# Patient Record
Sex: FEMALE | Race: WHITE | NOT HISPANIC OR LATINO | Employment: OTHER | URBAN - NONMETROPOLITAN AREA
[De-identification: names, ages, dates, MRNs, and addresses within clinical notes are randomized per-mention and may not be internally consistent; named-entity substitution may affect disease eponyms.]

---

## 2020-04-07 RX ORDER — AMLODIPINE BESYLATE 10 MG/1
TABLET ORAL
Qty: 90 TABLET | Refills: 1 | OUTPATIENT
Start: 2020-04-07

## 2020-06-22 PROBLEM — I25.84 CORONARY ARTERY DISEASE DUE TO CALCIFIED CORONARY LESION: Status: ACTIVE | Noted: 2020-06-22

## 2020-06-22 PROBLEM — R60.0 EDEMA LEG: Status: ACTIVE | Noted: 2020-06-22

## 2020-06-22 PROBLEM — E11.9 DIABETES MELLITUS: Status: ACTIVE | Noted: 2020-06-22

## 2020-06-22 PROBLEM — G47.33 OSA ON CPAP: Status: ACTIVE | Noted: 2020-06-22

## 2020-06-22 PROBLEM — I25.2 OLD MI (MYOCARDIAL INFARCTION): Status: ACTIVE | Noted: 2020-06-22

## 2020-06-22 PROBLEM — I10 ESSENTIAL HYPERTENSION: Status: ACTIVE | Noted: 2020-06-22

## 2020-06-22 PROBLEM — Z99.89 OSA ON CPAP: Status: ACTIVE | Noted: 2020-06-22

## 2020-06-22 PROBLEM — J45.909 ASTHMA: Status: ACTIVE | Noted: 2020-06-22

## 2020-06-22 PROBLEM — I49.5 SSS (SICK SINUS SYNDROME): Status: ACTIVE | Noted: 2020-06-22

## 2020-06-22 PROBLEM — I25.10 CORONARY ARTERY DISEASE DUE TO CALCIFIED CORONARY LESION: Status: ACTIVE | Noted: 2020-06-22

## 2020-06-22 PROBLEM — E78.2 MIXED HYPERLIPIDEMIA: Status: ACTIVE | Noted: 2020-06-22

## 2020-06-22 PROBLEM — K21.9 GERD (GASTROESOPHAGEAL REFLUX DISEASE): Status: ACTIVE | Noted: 2020-06-22

## 2020-06-22 PROBLEM — R06.02 SHORTNESS OF BREATH: Status: ACTIVE | Noted: 2020-06-22

## 2020-06-22 RX ORDER — METOPROLOL TARTRATE 100 MG/1
100 TABLET ORAL 2 TIMES DAILY
COMMUNITY
End: 2021-06-07 | Stop reason: SDUPTHER

## 2020-06-22 RX ORDER — NITROGLYCERIN 400 UG/1
1 SPRAY ORAL
COMMUNITY
End: 2021-06-07 | Stop reason: SDUPTHER

## 2020-06-22 RX ORDER — AMLODIPINE BESYLATE 10 MG/1
10 TABLET ORAL DAILY
COMMUNITY
End: 2021-06-07 | Stop reason: SDUPTHER

## 2020-06-22 RX ORDER — ALLOPURINOL 300 MG/1
300 TABLET ORAL DAILY
COMMUNITY

## 2020-06-22 RX ORDER — LISINOPRIL 40 MG/1
40 TABLET ORAL DAILY
COMMUNITY
End: 2021-06-07 | Stop reason: SDUPTHER

## 2020-06-22 RX ORDER — ROSUVASTATIN CALCIUM 20 MG/1
20 TABLET, COATED ORAL NIGHTLY
COMMUNITY
End: 2021-06-07 | Stop reason: SDUPTHER

## 2020-06-22 RX ORDER — LANOLIN ALCOHOL/MO/W.PET/CERES
1000 CREAM (GRAM) TOPICAL DAILY
COMMUNITY

## 2020-06-22 NOTE — PATIENT INSTRUCTIONS
Advance Care Planning and Advance Directives     You make decisions on a daily basis - decisions about where you want to live, your career, your home, your life. Perhaps one of the most important decisions you face is your choice for future medical care. Take time to talk with your family and your healthcare team and start planning today.  Advance Care Planning is a process that can help you:  · Understand possible future healthcare decisions in light of your own experiences  · Reflect on those decision in light of your goals and values  · Discuss your decisions with those closest to you and the healthcare professionals that care for you  · Make a plan by creating a document that reflects your wishes    Surrogate Decision Maker  In the event of a medical emergency, which has left you unable to communicate or to make your own decisions, you would need someone to make decisions for you.  It is important to discuss your preferences for medical treatment with this person while you are in good health.     Qualities of a surrogate decision maker:  • Willing to take on this role and responsibility  • Knows what you want for future medical care  • Willing to follow your wishes even if they don't agree with them  • Able to make difficult medical decisions under stressful circumstances    Advance Directives  These are legal documents you can create that will guide your healthcare team and decision maker(s) when needed. These documents can be stored in the electronic medical record.    · Living Will - a legal document to guide your care if you have a terminal condition or a serious illness and are unable to communicate. States vary by statute in document names/types, but most forms may include one or more of the following:        -  Directions regarding life-prolonging treatments        -  Directions regarding artificially provided nutrition/hydration        -  Choosing a healthcare decision maker        -  Direction  regarding organ/tissue donation    · Durable Power of  for Healthcare - this document names an -in-fact to make medical decisions for you, but it may also allow this person to make personal and financial decisions for you. Please seek the advice of an  if you need this type of document.    **Advance Directives are not required and no one may discriminate against you if you do not sign one.    Medical Orders  Many states allow specific forms/orders signed by your physician to record your wishes for medical treatment in your current state of health. This form, signed in personal communication with your physician, addresses resuscitation and other medical interventions that you may or may not want.      For more information or to schedule a time with a [facility] Advance Care Planning Facilitator contact: [facility] or call [phone] and someone will contact you directly.Fat and Cholesterol Restricted Eating Plan  Getting too much fat and cholesterol in your diet may cause health problems. Choosing the right foods helps keep your fat and cholesterol at normal levels. This can keep you from getting certain diseases.  Your doctor may recommend an eating plan that includes:  · Total fat: ______% or less of total calories a day.  · Saturated fat: ______% or less of total calories a day.  · Cholesterol: less than _________mg a day.  · Fiber: ______g a day.  What are tips for following this plan?  Meal planning  · At meals, divide your plate into four equal parts:  ? Fill one-half of your plate with vegetables and green salads.  ? Fill one-fourth of your plate with whole grains.  ? Fill one-fourth of your plate with low-fat (lean) protein foods.  · Eat fish that is high in omega-3 fats at least two times a week. This includes mackerel, tuna, sardines, and salmon.  · Eat foods that are high in fiber, such as whole grains, beans, apples, broccoli, carrots, peas, and barley.  General  "tips    · Work with your doctor to lose weight if you need to.  · Avoid:  ? Foods with added sugar.  ? Fried foods.  ? Foods with partially hydrogenated oils.  · Limit alcohol intake to no more than 1 drink a day for nonpregnant women and 2 drinks a day for men. One drink equals 12 oz of beer, 5 oz of wine, or 1½ oz of hard liquor.  Reading food labels  · Check food labels for:  ? Trans fats.  ? Partially hydrogenated oils.  ? Saturated fat (g) in each serving.  ? Cholesterol (mg) in each serving.  ? Fiber (g) in each serving.  · Choose foods with healthy fats, such as:  ? Monounsaturated fats.  ? Polyunsaturated fats.  ? Omega-3 fats.  · Choose grain products that have whole grains. Look for the word \"whole\" as the first word in the ingredient list.  Cooking  · Cook foods using low-fat methods. These include baking, boiling, grilling, and broiling.  · Eat more home-cooked foods. Eat at restaurants and buffets less often.  · Avoid cooking using saturated fats, such as butter, cream, palm oil, palm kernel oil, and coconut oil.  Recommended foods    Fruits  · All fresh, canned (in natural juice), or frozen fruits.  Vegetables  · Fresh or frozen vegetables (raw, steamed, roasted, or grilled). Green salads.  Grains  · Whole grains, such as whole wheat or whole grain breads, crackers, cereals, and pasta. Unsweetened oatmeal, bulgur, barley, quinoa, or brown rice. Corn or whole wheat flour tortillas.  Meats and other protein foods  · Ground beef (85% or leaner), grass-fed beef, or beef trimmed of fat. Skinless chicken or turkey. Ground chicken or turkey. Pork trimmed of fat. All fish and seafood. Egg whites. Dried beans, peas, or lentils. Unsalted nuts or seeds. Unsalted canned beans. Nut butters without added sugar or oil.  Dairy  · Low-fat or nonfat dairy products, such as skim or 1% milk, 2% or reduced-fat cheeses, low-fat and fat-free ricotta or cottage cheese, or plain low-fat and nonfat yogurt.  Fats and oils  · Tub " margarine without trans fats. Light or reduced-fat mayonnaise and salad dressings. Avocado. Olive, canola, sesame, or safflower oils.  The items listed above may not be a complete list of foods and beverages you can eat. Contact a dietitian for more information.  Foods to avoid  Fruits  · Canned fruit in heavy syrup. Fruit in cream or butter sauce. Fried fruit.  Vegetables  · Vegetables cooked in cheese, cream, or butter sauce. Fried vegetables.  Grains  · White bread. White pasta. White rice. Cornbread. Bagels, pastries, and croissants. Crackers and snack foods that contain trans fat and hydrogenated oils.  Meats and other protein foods  · Fatty cuts of meat. Ribs, chicken wings, dickson, sausage, bologna, salami, chitterlings, fatback, hot dogs, bratwurst, and packaged lunch meats. Liver and organ meats. Whole eggs and egg yolks. Chicken and turkey with skin. Fried meat.  Dairy  · Whole or 2% milk, cream, half-and-half, and cream cheese. Whole milk cheeses. Whole-fat or sweetened yogurt. Full-fat cheeses. Nondairy creamers and whipped toppings. Processed cheese, cheese spreads, and cheese curds.  Beverages  · Alcohol. Sugar-sweetened drinks such as sodas, lemonade, and fruit drinks.  Fats and oils  · Butter, stick margarine, lard, shortening, ghee, or dickson fat. Coconut, palm kernel, and palm oils.  Sweets and desserts  · Corn syrup, sugars, honey, and molasses. Candy. Jam and jelly. Syrup. Sweetened cereals. Cookies, pies, cakes, donuts, muffins, and ice cream.  The items listed above may not be a complete list of foods and beverages you should avoid. Contact a dietitian for more information.  Summary  · Choosing the right foods helps keep your fat and cholesterol at normal levels. This can keep you from getting certain diseases.  · At meals, fill one-half of your plate with vegetables and green salads.  · Eat high-fiber foods, like whole grains, beans, apples, carrots, peas, and barley.  · Limit added sugar,  "saturated fats, alcohol, and fried foods.  This information is not intended to replace advice given to you by your health care provider. Make sure you discuss any questions you have with your health care provider.  Document Released: 06/18/2013 Document Revised: 08/21/2019 Document Reviewed: 09/04/2018  [employer] Patient Education © 2020 [employer]  BMI for Adults    Body mass index (BMI) is a number that is calculated from a person's weight and height. BMI may help to estimate how much of a person's weight is composed of fat. BMI can help identify those who may be at higher risk for certain medical problems.  How is BMI used with adults?  BMI is used as a screening tool to identify possible weight problems. It is used to check whether a person is obese, overweight, healthy weight, or underweight.  How is BMI calculated?  BMI measures your weight and compares it to your height. This can be done either in English ([place] or metric measurements. Note that charts are available to help you find your BMI quickly and easily without having to do these calculations yourself.  To calculate your BMI in English ([place] measurements, your health care provider will:  1. Measure your weight in pounds (lb).  2. Multiply the number of pounds by 703.  ? For example, for a person who weighs 180 lb, multiply that number by 703, which equals 126,540.  3. Measure your height in inches (in). Then multiply that number by itself to get a measurement called \"inches squared.\"  ? For example, for a person who is 70 in tall, the \"inches squared\" measurement is 70 in x 70 in, which equals 4900 inches squared.  4. Divide the total from Step 2 (number of lb x 703) by the total from Step 3 (inches squared): 126,540 ÷ 4900 = 25.8. This is your BMI.  To calculate your BMI in metric measurements, your health care provider will:  1. Measure your weight in kilograms (kg).  2. Measure your height in meters (m). Then multiply that number by itself to get " "a measurement called \"meters squared.\"  ? For example, for a person who is 1.75 m tall, the \"meters squared\" measurement is 1.75 m x 1.75 m, which is equal to 3.1 meters squared.  3. Divide the number of kilograms (your weight) by the meters squared number. In this example: 70 ÷ 3.1 = 22.6. This is your BMI.  How is BMI interpreted?  To interpret your results, your health care provider will use BMI charts to identify whether you are underweight, normal weight, overweight, or obese. The following guidelines will be used:  · Underweight: BMI less than 18.5.  · Normal weight: BMI between 18.5 and 24.9.  · Overweight: BMI between 25 and 29.9.  · Obese: BMI of 30 and above.  Please note:  · Weight includes both fat and muscle, so someone with a muscular build, such as an athlete, may have a BMI that is higher than 24.9. In cases like these, BMI is not an accurate measure of body fat.  · To determine if excess body fat is the cause of a BMI of 25 or higher, further assessments may need to be done by a health care provider.  · BMI is usually interpreted in the same way for men and women.  Why is BMI a useful tool?  BMI is useful in two ways:  · Identifying a weight problem that may be related to a medical condition, or that may increase the risk for medical problems.  · Promoting lifestyle and diet changes in order to reach a healthy weight.  Summary  · Body mass index (BMI) is a number that is calculated from a person's weight and height.  · BMI may help to estimate how much of a person's weight is composed of fat. BMI can help identify those who may be at higher risk for certain medical problems.  · BMI can be measured using English measurements or metric measurements.  · To interpret your results, your health care provider will use BMI charts to identify whether you are underweight, normal weight, overweight, or obese.  This information is not intended to replace advice given to you by your health care provider. Make " sure you discuss any questions you have with your health care provider.  Document Released: 08/29/2005 Document Revised: 11/30/2018 Document Reviewed: 10/31/2018  [employer] Patient Education © 2020 [employer]

## 2020-06-26 ENCOUNTER — OFFICE VISIT (OUTPATIENT)
Dept: CARDIOLOGY | Facility: CLINIC | Age: 75
End: 2020-06-26

## 2020-06-26 VITALS
HEIGHT: 64 IN | OXYGEN SATURATION: 97 % | BODY MASS INDEX: 35.61 KG/M2 | HEART RATE: 75 BPM | DIASTOLIC BLOOD PRESSURE: 57 MMHG | SYSTOLIC BLOOD PRESSURE: 108 MMHG | WEIGHT: 208.6 LBS | TEMPERATURE: 98 F

## 2020-06-26 DIAGNOSIS — I25.84 CORONARY ARTERY DISEASE DUE TO CALCIFIED CORONARY LESION: Primary | ICD-10-CM

## 2020-06-26 DIAGNOSIS — I25.2 OLD MI (MYOCARDIAL INFARCTION): ICD-10-CM

## 2020-06-26 DIAGNOSIS — I49.5 SSS (SICK SINUS SYNDROME) (HCC): ICD-10-CM

## 2020-06-26 DIAGNOSIS — R06.02 SHORTNESS OF BREATH: ICD-10-CM

## 2020-06-26 DIAGNOSIS — E78.2 MIXED HYPERLIPIDEMIA: ICD-10-CM

## 2020-06-26 DIAGNOSIS — I25.10 CORONARY ARTERY DISEASE DUE TO CALCIFIED CORONARY LESION: Primary | ICD-10-CM

## 2020-06-26 DIAGNOSIS — I10 ESSENTIAL HYPERTENSION: ICD-10-CM

## 2020-06-26 PROCEDURE — 99213 OFFICE O/P EST LOW 20 MIN: CPT | Performed by: SPECIALIST

## 2020-06-26 RX ORDER — CETIRIZINE HYDROCHLORIDE 10 MG/1
10 TABLET ORAL DAILY
COMMUNITY

## 2020-06-26 RX ORDER — FUROSEMIDE 20 MG/1
20 TABLET ORAL DAILY
COMMUNITY
End: 2021-06-07 | Stop reason: SDUPTHER

## 2020-06-26 RX ORDER — INSULIN GLARGINE 100 [IU]/ML
32 INJECTION, SOLUTION SUBCUTANEOUS 2 TIMES DAILY
COMMUNITY
End: 2021-09-24 | Stop reason: DRUGHIGH

## 2020-06-26 RX ORDER — POTASSIUM CHLORIDE 750 MG/1
10 TABLET, FILM COATED, EXTENDED RELEASE ORAL DAILY
COMMUNITY
End: 2021-06-07

## 2020-06-26 NOTE — PROGRESS NOTES
Subjective   Follow up, Hypertension  Susan Page is a 75 y.o. female who presents to day for Establish Care (Here to establish care ).    CHIEF COMPLIANT  Chief Complaint   Patient presents with   • Establish Care     Here to establish care        Problem list:  1. CAD  1.1 [facility], 9-, EF 50-55%, LAD LIs, nl. RCA, NL. LCx  2. SSS  2. HX MI in past  4. HTN  5. Hyperlipidemia  6. PILAR, uses C-PAP, followed by Dr. Collins.  7. Asthma  8. GERD  9. NIDDM          Active Problems:  Problem List Items Addressed This Visit        Cardiovascular and Mediastinum    Coronary artery disease due to calcified coronary lesion - Primary    Relevant Medications    metoprolol tartrate (LOPRESSOR) 100 MG tablet    amLODIPine (NORVASC) 10 MG tablet    nitroglycerin (NITROLINGUAL) 0.4 MG/SPRAY spray    Old MI (myocardial infarction)    Relevant Medications    metoprolol tartrate (LOPRESSOR) 100 MG tablet    amLODIPine (NORVASC) 10 MG tablet    nitroglycerin (NITROLINGUAL) 0.4 MG/SPRAY spray    SSS (sick sinus syndrome) (CMS/HCC)    Relevant Medications    metoprolol tartrate (LOPRESSOR) 100 MG tablet    amLODIPine (NORVASC) 10 MG tablet    nitroglycerin (NITROLINGUAL) 0.4 MG/SPRAY spray    Essential hypertension    Relevant Medications    lisinopril (PRINIVIL,ZESTRIL) 40 MG tablet    metoprolol tartrate (LOPRESSOR) 100 MG tablet    amLODIPine (NORVASC) 10 MG tablet    Mixed hyperlipidemia    Relevant Medications    rosuvastatin (CRESTOR) 40 MG tablet       Respiratory    Shortness of breath          HPI      Denies chest pain, stable dyspnea on moderated exertion, stable mild LE edema, no palpitations, no dizziness, denies other cardiac symptoms  Now using CPAP every night                      PRIOR MEDS  Current Outpatient Medications on File Prior to Visit   Medication Sig Dispense Refill   • allopurinol (ZYLOPRIM) 300 MG tablet Take 300 mg by mouth 2 (Two) Times a Day.     • amLODIPine (NORVASC) 10 MG tablet Take 10 mg by mouth  Daily.     • aspirin 81 MG EC tablet Take 81 mg by mouth Daily.     • lisinopril (PRINIVIL,ZESTRIL) 40 MG tablet Take 40 mg by mouth Daily.     • metoprolol tartrate (LOPRESSOR) 100 MG tablet Take 100 mg by mouth 2 (Two) Times a Day.     • Multiple Vitamins-Minerals (MULTIVITAMIN ADULTS PO) Take  by mouth Daily.     • nitroglycerin (NITROLINGUAL) 0.4 MG/SPRAY spray Place 1 spray under the tongue Every 5 (Five) Minutes As Needed.     • rosuvastatin (CRESTOR) 40 MG tablet Take 40 mg by mouth Every Night.     • vitamin B-12 (CYANOCOBALAMIN) 100 MCG tablet Take 50 mcg by mouth Daily.       No current facility-administered medications on file prior to visit.        ALLERGIES  Codeine    HISTORY  Past Medical History:   Diagnosis Date   • Asthma    • CAD (coronary artery disease)    • Diabetes mellitus (CMS/[facility])    • Edema    • GERD (gastroesophageal reflux disease)    • HTN (hypertension)    • Hyperlipidemia    • Old MI (myocardial infarction)    • PILAR on CPAP    • Shortness of breath    • SSS (sick sinus syndrome) (CMS/[facility])        Social History     Socioeconomic History   • Marital status:      Spouse name: Not on file   • Number of children: Not on file   • Years of education: Not on file   • Highest education level: Not on file   Tobacco Use   • Smoking status: Never Smoker   • Smokeless tobacco: Never Used   Substance and Sexual Activity   • Alcohol use: Never     Frequency: Never   • Drug use: Never   • Sexual activity: Defer       Family History   Problem Relation Age of Onset   • No Known Problems Mother    • Asthma Father    • No Known Problems Brother        Review of Systems   Constitutional: Positive for fatigue. Negative for chills and fever.   HENT: Negative.  Negative for congestion, rhinorrhea and sore throat.    Eyes: Positive for visual disturbance (glasses daily ).   Respiratory: Positive for apnea (uses cpap nightly ), cough and shortness of breath. Negative for chest tightness.   "  Cardiovascular: Positive for leg swelling (LE edema which resolves overnight ). Negative for chest pain and palpitations.   Gastrointestinal: Negative.  Negative for abdominal pain, blood in stool, nausea and vomiting.   Endocrine: Negative.  Negative for cold intolerance and heat intolerance.   Genitourinary: Negative.  Negative for dysuria, frequency, hematuria and urgency.   Musculoskeletal: Positive for back pain (low back pain ). Negative for arthralgias.   Skin: Negative.  Negative for rash and wound.   Allergic/Immunologic: Positive for environmental allergies (seasonal ). Negative for food allergies.   Neurological: Negative.  Negative for dizziness, weakness and light-headedness.   Hematological: Bruises/bleeds easily.   Psychiatric/Behavioral: Negative.  Negative for sleep disturbance (denies waking with smothering ).       Objective     VITALS: /57 (BP Location: Left arm, Patient Position: Sitting)   Pulse 75   Temp 98 °F (36.7 °C)   Ht 162.6 cm (64\")   Wt 94.6 kg (208 lb 9.6 oz)   SpO2 97%   BMI 35.81 kg/m²     LABS:   Lab Results (most recent)     None          IMAGING:   No Images in the past 120 days found..    EXAM:  Physical Exam   Constitutional: She is oriented to person, place, and time. She appears well-developed and well-nourished.   HENT:   Head: Normocephalic and atraumatic.   Eyes: Pupils are equal, round, and reactive to light.   Neck: Neck supple. No JVD present. No thyromegaly present.   Cardiovascular: Normal rate, regular rhythm, normal heart sounds and intact distal pulses. Exam reveals no gallop and no friction rub.   No murmur heard.  Pulmonary/Chest: Effort normal and breath sounds normal. No stridor. No respiratory distress. She has no wheezes. She has no rales. She exhibits no tenderness.   Musculoskeletal: She exhibits no edema, tenderness or deformity.   Neurological: She is alert and oriented to person, place, and time. No cranial nerve deficit. Coordination " normal.   Skin: Skin is warm and dry.   Psychiatric: She has a normal mood and affect.   Vitals reviewed.      Procedure   Procedures       Assessment/Plan    Diagnosis Plan   1. Coronary artery disease due to calcified coronary lesion     2. Old MI (myocardial infarction)     3. SSS (sick sinus syndrome) (CMS/HCC)     4. Essential hypertension     5. Mixed hyperlipidemia     6. Shortness of breath     1. Blood pressure is well controlled, will continue current management  2. Heart rate is above seventy, will monitor  3. I reviewed labs, with elevated glucose, creatinine and triglycerides, I discussed result with patient, diet and exercise discussed, will get lipid profile prior to next visit  4. No chest pain, will continue current mangement  5. Advised to lose weight  6. Using CPAP every night, Dr Schultz is following    No follow-ups on file.    Susan was seen today for establish care.    Diagnoses and all orders for this visit:    Coronary artery disease due to calcified coronary lesion    Old MI (myocardial infarction)    SSS (sick sinus syndrome) (CMS/HCC)    Essential hypertension    Mixed hyperlipidemia    Shortness of breath              MEDS ORDERED DURING VISIT:  No orders of the defined types were placed in this encounter.        This document has been electronically signed by Cassidy Villarreal MD  June 26, 2020 11:08

## 2021-04-07 ENCOUNTER — OFFICE VISIT (OUTPATIENT)
Dept: CARDIOLOGY | Facility: CLINIC | Age: 76
End: 2021-04-07

## 2021-04-07 VITALS
DIASTOLIC BLOOD PRESSURE: 86 MMHG | SYSTOLIC BLOOD PRESSURE: 148 MMHG | HEART RATE: 76 BPM | HEIGHT: 64 IN | OXYGEN SATURATION: 95 % | WEIGHT: 205.6 LBS | BODY MASS INDEX: 35.1 KG/M2

## 2021-04-07 DIAGNOSIS — R60.0 EDEMA LEG: ICD-10-CM

## 2021-04-07 DIAGNOSIS — R55 SYNCOPE AND COLLAPSE: ICD-10-CM

## 2021-04-07 DIAGNOSIS — I25.2 OLD MI (MYOCARDIAL INFARCTION): ICD-10-CM

## 2021-04-07 DIAGNOSIS — I25.10 CORONARY ARTERY DISEASE DUE TO CALCIFIED CORONARY LESION: Primary | ICD-10-CM

## 2021-04-07 DIAGNOSIS — I49.5 SSS (SICK SINUS SYNDROME) (HCC): ICD-10-CM

## 2021-04-07 DIAGNOSIS — R06.02 SHORTNESS OF BREATH: ICD-10-CM

## 2021-04-07 DIAGNOSIS — Z99.89 OSA ON CPAP: ICD-10-CM

## 2021-04-07 DIAGNOSIS — E78.2 MIXED HYPERLIPIDEMIA: ICD-10-CM

## 2021-04-07 DIAGNOSIS — G47.33 OSA ON CPAP: ICD-10-CM

## 2021-04-07 DIAGNOSIS — I25.84 CORONARY ARTERY DISEASE DUE TO CALCIFIED CORONARY LESION: Primary | ICD-10-CM

## 2021-04-07 PROCEDURE — 99214 OFFICE O/P EST MOD 30 MIN: CPT | Performed by: SPECIALIST

## 2021-04-07 PROCEDURE — 93000 ELECTROCARDIOGRAM COMPLETE: CPT | Performed by: SPECIALIST

## 2021-04-07 NOTE — PROGRESS NOTES
Subjective   Follow up, syncope  Susan Page is a 75 y.o. female who presents to day for Follow-up (Here for 6 mo. f/u), Coronary Artery Disease, Hyperlipidemia, Hypertension, and Sleep Apnea.    CHIEF COMPLIANT  Chief Complaint   Patient presents with   • Follow-up     Here for 6 mo. f/u   • Coronary Artery Disease   • Hyperlipidemia   • Hypertension   • Sleep Apnea     Problem list:    1. CAD  1.1 [facility], 9-, EF 50-55%, LAD LIs, nl. RCA, NL. LCx  2. SSS  2. HX MI in past  4. HTN  5. Hyperlipidemia  6. PILAR, uses C-PAP, followed by Dr. Collins.  7. Asthma  8. GERD  9. NIDDM      Problem List Items Addressed This Visit        Cardiac and Vasculature    Coronary artery disease due to calcified coronary lesion - Primary    Old MI (myocardial infarction)    SSS (sick sinus syndrome) (CMS/[facility])    Relevant Orders    Cardiac Event Monitor    Mixed hyperlipidemia    Relevant Orders    Lipid Panel    Comprehensive Metabolic Panel       Pulmonary and Pneumonias    Shortness of breath    Relevant Orders    ECG 12 Lead    Adult Transthoracic Echo Complete w/ Color, Spectral and Contrast if necessary per protocol       Sleep    PILAR on CPAP       Symptoms and Signs    Edema leg    Syncope and collapse    Relevant Orders    ECG 12 Lead    Cardiac Event Monitor    Adult Transthoracic Echo Complete w/ Color, Spectral and Contrast if necessary per protocol          HPI      Started having episodes of dizziness and brief syncopal episodes for the last month or so she had one episode recently where she fell backward she was told that she has broken some ribs that she does seek medical attention immediately but later on she saw her primary care physician usually with no prodrome and no warning it happened suddenly while she stands you should be unconscious for few seconds so when she comes out there is no chest pain no headache no shortness of breath she denies any palpitations or chest pain she has mild lower extremity edema which  is stable                  PRIOR MEDS  Current Outpatient Medications on File Prior to Visit   Medication Sig Dispense Refill   • allopurinol (ZYLOPRIM) 300 MG tablet Take 300 mg by mouth Daily.     • amLODIPine (NORVASC) 10 MG tablet Take 10 mg by mouth Daily.     • aspirin 325 MG EC tablet Take 325 mg by mouth Daily.     • cetirizine (zyrTEC) 10 MG tablet Take 10 mg by mouth Daily.     • furosemide (LASIX) 20 MG tablet Take 20 mg by mouth Daily.     • insulin glargine (LANTUS) 100 UNIT/ML injection Inject 32 Units under the skin into the appropriate area as directed 2 (Two) Times a Day.     • lisinopril (PRINIVIL,ZESTRIL) 40 MG tablet Take 40 mg by mouth Daily.     • metoprolol tartrate (LOPRESSOR) 100 MG tablet Take 100 mg by mouth 2 (Two) Times a Day.     • Multiple Vitamins-Minerals (MULTIVITAMIN ADULTS PO) Take  by mouth Daily.     • potassium chloride (K-DUR) 10 MEQ CR tablet Take 10 mEq by mouth Daily. With Lasix     • rosuvastatin (CRESTOR) 20 MG tablet Take 20 mg by mouth Every Night.     • vitamin B-12 (CYANOCOBALAMIN) 1000 MCG tablet Take 1,000 mcg by mouth Daily.     • nitroglycerin (NITROLINGUAL) 0.4 MG/SPRAY spray Place 1 spray under the tongue Every 5 (Five) Minutes As Needed.       No current facility-administered medications on file prior to visit.       ALLERGIES  Codeine    HISTORY  Past Medical History:   Diagnosis Date   • Asthma    • CAD (coronary artery disease)    • Diabetes mellitus (CMS/[facility])    • Edema    • GERD (gastroesophageal reflux disease)    • HTN (hypertension)    • Hyperlipidemia    • Old MI (myocardial infarction)    • PILAR on CPAP    • Shortness of breath    • SSS (sick sinus syndrome) (CMS/[facility])        Social History     Socioeconomic History   • Marital status:      Spouse name: Not on file   • Number of children: Not on file   • Years of education: Not on file   • Highest education level: Not on file   Tobacco Use   • Smoking status: Never Smoker   • Smokeless tobacco:  "Never Used   Substance and Sexual Activity   • Alcohol use: Never   • Drug use: Never   • Sexual activity: Defer       Family History   Problem Relation Age of Onset   • No Known Problems Mother    • Asthma Father    • No Known Problems Brother        Review of Systems   Constitutional: Positive for fatigue.   HENT: Negative.    Eyes: Positive for visual disturbance (wears glasses).   Respiratory: Positive for shortness of breath.    Cardiovascular: Positive for leg swelling. Negative for chest pain and palpitations.   Gastrointestinal: Negative.    Endocrine: Negative.    Genitourinary: Negative.    Musculoskeletal: Positive for arthralgias, gait problem (ambulates with rolling walker) and myalgias.   Skin: Negative.    Allergic/Immunologic: Positive for environmental allergies.   Neurological: Positive for dizziness and light-headedness.   Hematological: Bruises/bleeds easily.   Psychiatric/Behavioral: Negative.        Objective     VITALS: /86 (BP Location: Left arm, Patient Position: Sitting)   Pulse 76   Ht 162.6 cm (64.02\")   Wt 93.3 kg (205 lb 9.6 oz)   SpO2 95%   BMI 35.27 kg/m²     LABS:   Lab Results (most recent)     None          IMAGING:   No Images in the past 120 days found..    EXAM:  Physical Exam  Vitals reviewed.   Constitutional:       Appearance: She is well-developed.   HENT:      Head: Normocephalic and atraumatic.   Eyes:      Pupils: Pupils are equal, round, and reactive to light.   Neck:      Thyroid: No thyromegaly.      Vascular: No JVD.   Cardiovascular:      Rate and Rhythm: Normal rate and regular rhythm.      Heart sounds: Normal heart sounds. No murmur heard.   No friction rub. No gallop.    Pulmonary:      Effort: Pulmonary effort is normal. No respiratory distress.      Breath sounds: Normal breath sounds. No stridor. No wheezing or rales.   Chest:      Chest wall: No tenderness.   Musculoskeletal:         General: No tenderness or deformity.      Cervical back: Neck " supple.   Skin:     General: Skin is warm and dry.   Neurological:      Mental Status: She is alert and oriented to person, place, and time.      Cranial Nerves: No cranial nerve deficit.      Coordination: Coordination normal.         Procedure     ECG 12 Lead    Date/Time: 4/7/2021 4:03 PM  Performed by: Cassidy Villarreal MD  Authorized by: Cassidy Villarreal MD   Comparison: not compared with previous ECG   Previous ECG: no previous ECG available          EKG: Normal sinus rhythm left axis deviation with borderline left ventricular hypertrophy pattern and diffuse nonspecific ST-T changes, no previous EKG for comparison       Assessment/Plan     Diagnoses and all orders for this visit:    1. Coronary artery disease due to calcified coronary lesion (Primary)    2. Mixed hyperlipidemia  -     Lipid Panel; Future  -     Comprehensive Metabolic Panel; Future    3. Old MI (myocardial infarction)    4. SSS (sick sinus syndrome) (CMS/[facility])  -     Cardiac Event Monitor; Future    5. Shortness of breath  -     ECG 12 Lead  -     Adult Transthoracic Echo Complete w/ Color, Spectral and Contrast if necessary per protocol; Future    6. PILAR on CPAP    7. Edema leg    8. Syncope and collapse  -     ECG 12 Lead  -     Cardiac Event Monitor; Future  -     Adult Transthoracic Echo Complete w/ Color, Spectral and Contrast if necessary per protocol; Future    1.  She had syncopal episode she said she had several of these for the last month or so but she cannot know how many pressure marks which could be related to also with orthostatic pressure versus arrhythmia so I am going to get an event monitor for assessment of arrhythmia and also repeat echocardiogram to assess her cardiac functional motion valve morphology  2.  There is no recent chest pain or other symptoms of angina we will continue current management  3.  I do not have any recent lab reports I want to get labs include lipids and CMP  4.  She continues to have some mild shortness  of breath and edema advised to eat low-salt diet and lose weight  5.  She is on CPAP every night Dr. Schultz monitoring  6.  She is using a walker advised to continue doing that  7.  Her blood pressure is not elevated will monitor for now because of the episodes of syncope Gini to change her medications at this point  Return in about 4 weeks (around 5/5/2021).    Susan Page  reports that she has never smoked. She has never used smokeless tobacco.. I have educated her on the risk of diseases from using tobacco products such as cancer, COPD and heart disease.              Advance Care Planning   ACP discussion was held with the patient during this visit. Patient does not have an advance directive, declines further assistance.     Patient's Body mass index is 35.27 kg/m². BMI is above normal parameters. Recommendations include: educational material and referral to primary care.           MEDS ORDERED DURING VISIT:  No orders of the defined types were placed in this encounter.      As always, Yodit Ho MD  I appreciate very much the opportunity to participate in the cardiovascular care of your patients. Please do not hesitate to call me with any questions with regards to Susan Page evaluation and management.         This document has been electronically signed by Cassidy Villarreal MD  April 7, 2021 16:19 EDT

## 2021-04-07 NOTE — PATIENT INSTRUCTIONS
Obesity, Adult  Obesity is the condition of having too much total body fat. Being overweight or obese means that your weight is greater than what is considered healthy for your body size. Obesity is determined by a measurement called BMI. BMI is an estimate of body fat and is calculated from height and weight. For adults, a BMI of 30 or higher is considered obese.  Obesity can lead to other health concerns and major illnesses, including:  · Stroke.  · Coronary artery disease (CAD).  · Type 2 diabetes.  · Some types of cancer, including cancers of the colon, breast, uterus, and gallbladder.  · Osteoarthritis.  · High blood pressure (hypertension).  · High cholesterol.  · Sleep apnea.  · Gallbladder stones.  · Infertility problems.  What are the causes?  Common causes of this condition include:  · Eating daily meals that are high in calories, sugar, and fat.  · Being born with genes that may make you more likely to become obese.  · Having a medical condition that causes obesity, including:  ? Hypothyroidism.  ? Polycystic ovarian syndrome (PCOS).  ? Binge-eating disorder.  ? Cushing syndrome.  · Taking certain medicines, such as steroids, antidepressants, and seizure medicines.  · Not being physically active (sedentary lifestyle).  · Not getting enough sleep.  · Drinking high amounts of sugar-sweetened beverages, such as soft drinks.  What increases the risk?  The following factors may make you more likely to develop this condition:  · Having a family history of obesity.  · Being a woman of  descent.  · Being a man of  descent.  · Living in an area with limited access to:  ? [place], recreation centers, or sidewalks.  ? Healthy food choices, such as grocery stores and farmers' markets.  What are the signs or symptoms?  The main sign of this condition is having too much body fat.  How is this diagnosed?  This condition is diagnosed based on:  · Your BMI. If you are an adult with a BMI of 30 or  higher, you are considered obese.  · Your waist circumference. This measures the distance around your waistline.  · Your skinfold thickness. Your health care provider may gently pinch a fold of your skin and measure it.  You may have other tests to check for underlying conditions.  How is this treated?  Treatment for this condition often includes changing your lifestyle. Treatment may include some or all of the following:  · Dietary changes. This may include developing a healthy meal plan.  · Regular physical activity. This may include activity that causes your heart to beat faster (aerobic exercise) and strength training. Work with your health care provider to design an exercise program that works for you.  · Medicine to help you lose weight if you are unable to lose 1 pound a week after 6 weeks of healthy eating and more physical activity.  · Treating conditions that cause the obesity (underlying conditions).  · Surgery. Surgical options may include gastric banding and gastric bypass. Surgery may be done if:  ? Other treatments have not helped to improve your condition.  ? You have a BMI of 40 or higher.  ? You have life-threatening health problems related to obesity.  Follow these instructions at home:  Eating and drinking    · Follow recommendations from your health care provider about what you eat and drink. Your health care provider may advise you to:  ? Limit fast food, sweets, and processed snack foods.  ? Choose low-fat options, such as low-fat milk instead of whole milk.  ? Eat 5 or more servings of fruits or vegetables every day.  ? Eat at home more often. This gives you more control over what you eat.  ? Choose healthy foods when you eat out.  ? Learn to read food labels. This will help you understand how much food is considered 1 serving.  ? Learn what a healthy serving size is.  ? Keep low-fat snacks available.  ? Limit sugary drinks, such as soda, fruit juice, sweetened iced tea, and flavored  milk.  · Drink enough water to keep your urine pale yellow.  · Do not follow a fad diet. Fad diets can be unhealthy and even dangerous.  Physical activity  · Exercise regularly, as told by your health care provider.  ? Most adults should get up to 150 minutes of moderate-intensity exercise every week.  ? Ask your health care provider what types of exercise are safe for you and how often you should exercise.  · Warm up and stretch before being active.  · Cool down and stretch after being active.  · Rest between periods of activity.  Lifestyle  · Work with your health care provider and a dietitian to set a weight-loss goal that is healthy and reasonable for you.  · Limit your screen time.  · Find ways to reward yourself that do not involve food.  · Do not drink alcohol if:  ? Your health care provider tells you not to drink.  ? You are pregnant, may be pregnant, or are planning to become pregnant.  · If you drink alcohol:  ? Limit how much you use to:  § 0-1 drink a day for women.  § 0-2 drinks a day for men.  ? Be aware of how much alcohol is in your drink. In the [place] one drink equals one 12 oz bottle of beer (355 mL), one 5 oz glass of wine (148 mL), or one 1½ oz glass of hard liquor (44 mL).  General instructions  · Keep a weight-loss journal to keep track of the food you eat and how much exercise you get.  · Take over-the-counter and prescription medicines only as told by your health care provider.  · Take vitamins and supplements only as told by your health care provider.  · Consider joining a support group. Your health care provider may be able to recommend a support group.  · Keep all follow-up visits as told by your health care provider. This is important.  Contact a health care provider if:  · You are unable to meet your weight loss goal after 6 weeks of dietary and lifestyle changes.  Get help right away if you are having:  · Trouble breathing.  · Suicidal thoughts or behaviors.  Summary  · Obesity is the  condition of having too much total body fat.  · Being overweight or obese means that your weight is greater than what is considered healthy for your body size.  · Work with your health care provider and a dietitian to set a weight-loss goal that is healthy and reasonable for you.  · Exercise regularly, as told by your health care provider. Ask your health care provider what types of exercise are safe for you and how often you should exercise.  This information is not intended to replace advice given to you by your health care provider. Make sure you discuss any questions you have with your health care provider.  Document Revised: 08/22/2019 Document Reviewed: 08/22/2019  [employer] Patient Education © 2021 [employer]  MyPlate from [employer]    MyPlate is an outline of a general healthy diet based on the 2010 Dietary Guidelines for Americans, from the [place] Department of Agriculture (USDA). It sets guidelines for how much food you should eat from each food group based on your age, sex, and level of physical activity.  What are tips for following MyPlate?  To follow MyPlate recommendations:  · Eat a wide variety of fruits and vegetables, grains, and protein foods.  · Serve smaller portions and eat less food throughout the day.  · Limit portion sizes to avoid overeating.  · Enjoy your food.  · Get at least 150 minutes of exercise every week. This is about 30 minutes each day, 5 or more days per week.  It can be difficult to have every meal look like MyPlate. Think about MyPlate as eating guidelines for an entire day, rather than each individual meal.  Fruits and vegetables  · Make half of your plate fruits and vegetables.  · Eat many different colors of fruits and vegetables each day.  · For a 2,000 calorie daily food plan, eat:  ? 2½ cups of vegetables every day.  ? 2 cups of fruit every day.  · 1 cup is equal to:  ? 1 cup raw or cooked vegetables.  ? 1 cup raw fruit.  ? 1 medium-sized orange, apple, or banana.  ? 1 cup  100% fruit or vegetable juice.  ? 2 cups raw leafy greens, such as lettuce, spinach, or kale.  ? ½ cup dried fruit.  Grains  · One fourth of your plate should be grains.  · Make at least half of the grains you eat each day whole grains.  · For a 2,000 calorie daily food plan, eat 6 oz of grains every day.  · 1 oz is equal to:  ? 1 slice bread.  ? 1 cup cereal.  ? ½ cup cooked rice, cereal, or pasta.  Protein  · One fourth of your plate should be protein.  · Eat a wide variety of protein foods, including meat, poultry, fish, eggs, beans, nuts, and tofu.  · For a 2,000 calorie daily food plan, eat 5½ oz of protein every day.  · 1 oz is equal to:  ? 1 oz meat, poultry, or fish.  ? ¼ cup cooked beans.  ? 1 egg.  ? ½ oz nuts or seeds.  ? 1 Tbsp peanut butter.  Dairy  · Drink fat-free or low-fat (1%) milk.  · Eat or drink dairy as a side to meals.  · For a 2,000 calorie daily food plan, eat or drink 3 cups of dairy every day.  · 1 cup is equal to:  ? 1 cup milk, yogurt, cottage cheese, or soy milk (soy beverage).  ? 2 oz processed cheese.  ? 1½ oz natural cheese.  Fats, oils, salt, and sugars  · Only small amounts of oils are recommended.  · Avoid foods that are high in calories and low in nutritional value (empty calories), like foods high in fat or added sugars.  · Choose foods that are low in salt (sodium). Choose foods that have less than 140 milligrams (mg) of sodium per serving.  · Drink water instead of sugary drinks. Drink enough water each day to keep your urine pale yellow.  Where to find support  · Work with your health care provider or a nutrition specialist (dietitian) to develop a customized eating plan that is right for you.  · Download an sabrina (mobile application) to help you track your daily food intake.  Where to find more information  · Go to ChooseMyPlate.gov for more information.  Summary  · MyPlate is a general guideline for healthy eating from the USDA. It is based on the 2010 Dietary Guidelines for  Americans.  · In general, fruits and vegetables should take up ½ of your plate, grains should take up ¼ of your plate, and protein should take up ¼ of your plate.  This information is not intended to replace advice given to you by your health care provider. Make sure you discuss any questions you have with your health care provider.  Document Revised: 05/21/2020 Document Reviewed: 03/19/2018  [employer] Patient Education © 2021 [employer]

## 2021-04-12 ENCOUNTER — TELEPHONE (OUTPATIENT)
Dept: CARDIOLOGY | Facility: CLINIC | Age: 76
End: 2021-04-12

## 2021-04-12 NOTE — TELEPHONE ENCOUNTER
Pt presented in clinic for assistance w/ monitor.   Per chart review, pt is to wear monitor for _14__ days.     Assisted pt w/ monitor placement and explained how to use monitor: charging the monitor and phone, to wear #1 during the day and #2 at HS, to charge #2 during the day and charge #1 and the phone at HS, how to document sx by pushing the monitor button. Also explained not to recurrently change the strips as it causes more skin irritation. Advised pt to call our office or [employer] if they need sensitive strips.   Stated that the monitor will  anything serious or critical automatically, then [employer] and most likely our office will call to discuss what was seen on monitor.   Tabbed down the pages for troubleshooting if pt has an issue. Also explained how to send everything back to [employer], once completed.     Pt verbalized understanding of the above.

## 2021-05-17 ENCOUNTER — HOSPITAL ENCOUNTER (OUTPATIENT)
Dept: CARDIOLOGY | Facility: HOSPITAL | Age: 76
Discharge: HOME | End: 2021-05-17
Admitting: SPECIALIST

## 2021-05-17 VITALS — WEIGHT: 205.69 LBS | HEIGHT: 64 IN | BODY MASS INDEX: 35.12 KG/M2

## 2021-05-17 DIAGNOSIS — R06.02 SHORTNESS OF BREATH: ICD-10-CM

## 2021-05-17 DIAGNOSIS — R55 SYNCOPE AND COLLAPSE: ICD-10-CM

## 2021-05-17 LAB
AORTIC DIMENSIONLESS INDEX: 0.8 (DI)
BH CV ECHO MEAS - ACS: 1.5 CM
BH CV ECHO MEAS - AO MAX PG (FULL): 1.7 MMHG
BH CV ECHO MEAS - AO MAX PG: 5 MMHG
BH CV ECHO MEAS - AO MEAN PG (FULL): 1 MMHG
BH CV ECHO MEAS - AO MEAN PG: 2 MMHG
BH CV ECHO MEAS - AO ROOT AREA (BSA CORRECTED): 1.7
BH CV ECHO MEAS - AO ROOT AREA: 8.6 CM^2
BH CV ECHO MEAS - AO ROOT DIAM: 3.3 CM
BH CV ECHO MEAS - AO V2 MAX: 112 CM/SEC
BH CV ECHO MEAS - AO V2 MEAN: 71 CM/SEC
BH CV ECHO MEAS - AO V2 VTI: 25.6 CM
BH CV ECHO MEAS - BSA(HAYCOCK): 2.1 M^2
BH CV ECHO MEAS - BSA: 2 M^2
BH CV ECHO MEAS - BZI_BMI: 35.2 KILOGRAMS/M^2
BH CV ECHO MEAS - BZI_METRIC_HEIGHT: 162.6 CM
BH CV ECHO MEAS - BZI_METRIC_WEIGHT: 93 KG
BH CV ECHO MEAS - EDV(CUBED): 122.8 ML
BH CV ECHO MEAS - EDV(TEICH): 116.6 ML
BH CV ECHO MEAS - EF(CUBED): 60.1 %
BH CV ECHO MEAS - EF(TEICH): 51.4 %
BH CV ECHO MEAS - EF_3D-VOL: 52 %
BH CV ECHO MEAS - ESV(CUBED): 49 ML
BH CV ECHO MEAS - ESV(TEICH): 56.6 ML
BH CV ECHO MEAS - FS: 26.4 %
BH CV ECHO MEAS - IVS/LVPW: 1
BH CV ECHO MEAS - IVSD: 1.3 CM
BH CV ECHO MEAS - LA DIMENSION: 3.7 CM
BH CV ECHO MEAS - LA/AO: 1.1
BH CV ECHO MEAS - LAT PEAK E' VEL: 5.4 CM/SEC
BH CV ECHO MEAS - LV IVRT: 0.13 SEC
BH CV ECHO MEAS - LV MASS(C)D: 249.5 GRAMS
BH CV ECHO MEAS - LV MASS(C)DI: 126.2 GRAMS/M^2
BH CV ECHO MEAS - LV MAX PG: 3.3 MMHG
BH CV ECHO MEAS - LV MEAN PG: 1 MMHG
BH CV ECHO MEAS - LV V1 MAX: 91.4 CM/SEC
BH CV ECHO MEAS - LV V1 MEAN: 54.8 CM/SEC
BH CV ECHO MEAS - LV V1 VTI: 21.1 CM
BH CV ECHO MEAS - LVIDD: 5 CM
BH CV ECHO MEAS - LVIDS: 3.7 CM
BH CV ECHO MEAS - LVPWD: 1.2 CM
BH CV ECHO MEAS - MED PEAK E' VEL: 3.9 CM/SEC
BH CV ECHO MEAS - MR MAX PG: 23 MMHG
BH CV ECHO MEAS - MR MAX VEL: 240 CM/SEC
BH CV ECHO MEAS - MV A MAX VEL: 96.1 CM/SEC
BH CV ECHO MEAS - MV DEC SLOPE: 275 CM/SEC^2
BH CV ECHO MEAS - MV DEC TIME: 0.28 SEC
BH CV ECHO MEAS - MV E MAX VEL: 85.4 CM/SEC
BH CV ECHO MEAS - MV E/A: 0.89
BH CV ECHO MEAS - MV MAX PG: 4.8 MMHG
BH CV ECHO MEAS - MV MEAN PG: 2 MMHG
BH CV ECHO MEAS - MV P1/2T MAX VEL: 85 CM/SEC
BH CV ECHO MEAS - MV P1/2T: 90.5 MSEC
BH CV ECHO MEAS - MV V2 MAX: 109 CM/SEC
BH CV ECHO MEAS - MV V2 MEAN: 56.8 CM/SEC
BH CV ECHO MEAS - MV V2 VTI: 34.3 CM
BH CV ECHO MEAS - MVA P1/2T LCG: 2.6 CM^2
BH CV ECHO MEAS - MVA(P1/2T): 2.4 CM^2
BH CV ECHO MEAS - PA MAX PG (FULL): 0.54 MMHG
BH CV ECHO MEAS - PA MAX PG: 3.2 MMHG
BH CV ECHO MEAS - PA MEAN PG (FULL): 1 MMHG
BH CV ECHO MEAS - PA MEAN PG: 2 MMHG
BH CV ECHO MEAS - PA V2 MAX: 88.9 CM/SEC
BH CV ECHO MEAS - PA V2 MEAN: 62.7 CM/SEC
BH CV ECHO MEAS - PA V2 VTI: 18.7 CM
BH CV ECHO MEAS - RAP SYSTOLE: 10 MMHG
BH CV ECHO MEAS - RV MAX PG: 2.6 MMHG
BH CV ECHO MEAS - RV MEAN PG: 1 MMHG
BH CV ECHO MEAS - RV V1 MAX: 80.9 CM/SEC
BH CV ECHO MEAS - RV V1 MEAN: 52 CM/SEC
BH CV ECHO MEAS - RV V1 VTI: 18.9 CM
BH CV ECHO MEAS - RVSP: 28 MMHG
BH CV ECHO MEAS - SI(AO): 110.8 ML/M^2
BH CV ECHO MEAS - SI(CUBED): 37.3 ML/M^2
BH CV ECHO MEAS - SI(TEICH): 30.3 ML/M^2
BH CV ECHO MEAS - SV(AO): 219 ML
BH CV ECHO MEAS - SV(CUBED): 73.7 ML
BH CV ECHO MEAS - SV(TEICH): 60 ML
BH CV ECHO MEAS - TR MAX VEL: 212 CM/SEC
BH CV ECHO MEASUREMENTS AVERAGE E/E' RATIO: 18.37
MAXIMAL PREDICTED HEART RATE: 146 BPM
STRESS TARGET HR: 124 BPM

## 2021-05-17 PROCEDURE — 93306 TTE W/DOPPLER COMPLETE: CPT | Performed by: SPECIALIST

## 2021-05-17 PROCEDURE — 93306 TTE W/DOPPLER COMPLETE: CPT

## 2021-06-07 ENCOUNTER — OFFICE VISIT (OUTPATIENT)
Dept: CARDIOLOGY | Facility: CLINIC | Age: 76
End: 2021-06-07

## 2021-06-07 VITALS
HEART RATE: 86 BPM | SYSTOLIC BLOOD PRESSURE: 150 MMHG | OXYGEN SATURATION: 93 % | WEIGHT: 201.4 LBS | BODY MASS INDEX: 34.38 KG/M2 | DIASTOLIC BLOOD PRESSURE: 75 MMHG | HEIGHT: 64 IN

## 2021-06-07 DIAGNOSIS — I25.10 CORONARY ARTERY DISEASE DUE TO CALCIFIED CORONARY LESION: Primary | ICD-10-CM

## 2021-06-07 DIAGNOSIS — I10 ESSENTIAL HYPERTENSION: ICD-10-CM

## 2021-06-07 DIAGNOSIS — G47.33 OSA ON CPAP: ICD-10-CM

## 2021-06-07 DIAGNOSIS — I49.5 SSS (SICK SINUS SYNDROME) (HCC): ICD-10-CM

## 2021-06-07 DIAGNOSIS — E78.2 MIXED HYPERLIPIDEMIA: ICD-10-CM

## 2021-06-07 DIAGNOSIS — I25.2 OLD MI (MYOCARDIAL INFARCTION): ICD-10-CM

## 2021-06-07 DIAGNOSIS — R06.02 SHORTNESS OF BREATH: ICD-10-CM

## 2021-06-07 DIAGNOSIS — R60.0 EDEMA LEG: ICD-10-CM

## 2021-06-07 DIAGNOSIS — I25.84 CORONARY ARTERY DISEASE DUE TO CALCIFIED CORONARY LESION: Primary | ICD-10-CM

## 2021-06-07 DIAGNOSIS — Z99.89 OSA ON CPAP: ICD-10-CM

## 2021-06-07 DIAGNOSIS — R55 SYNCOPE AND COLLAPSE: ICD-10-CM

## 2021-06-07 PROCEDURE — 99214 OFFICE O/P EST MOD 30 MIN: CPT | Performed by: SPECIALIST

## 2021-06-07 RX ORDER — NITROGLYCERIN 400 UG/1
1 SPRAY ORAL
Qty: 100 EACH | Refills: 3 | Status: SHIPPED | OUTPATIENT
Start: 2021-06-07 | End: 2021-09-24 | Stop reason: SDUPTHER

## 2021-06-07 RX ORDER — LISINOPRIL 40 MG/1
40 TABLET ORAL DAILY
Qty: 90 TABLET | Refills: 1 | Status: SHIPPED | OUTPATIENT
Start: 2021-06-07 | End: 2021-09-24

## 2021-06-07 RX ORDER — METOPROLOL TARTRATE 100 MG/1
100 TABLET ORAL 2 TIMES DAILY
Qty: 180 TABLET | Refills: 1 | Status: SHIPPED | OUTPATIENT
Start: 2021-06-07 | End: 2021-09-24 | Stop reason: SDUPTHER

## 2021-06-07 RX ORDER — HYDROCODONE BITARTRATE AND ACETAMINOPHEN 5; 325 MG/1; MG/1
TABLET ORAL
COMMUNITY
End: 2021-09-24

## 2021-06-07 RX ORDER — FUROSEMIDE 20 MG/1
20 TABLET ORAL DAILY
Qty: 90 TABLET | Refills: 1 | Status: SHIPPED | OUTPATIENT
Start: 2021-06-07 | End: 2021-09-24 | Stop reason: DRUGHIGH

## 2021-06-07 RX ORDER — ROSUVASTATIN CALCIUM 20 MG/1
20 TABLET, COATED ORAL NIGHTLY
Qty: 90 TABLET | Refills: 1 | Status: SHIPPED | OUTPATIENT
Start: 2021-06-07 | End: 2021-09-24 | Stop reason: SDUPTHER

## 2021-06-07 RX ORDER — AMLODIPINE BESYLATE 10 MG/1
10 TABLET ORAL DAILY
Qty: 90 TABLET | Refills: 1 | Status: SHIPPED | OUTPATIENT
Start: 2021-06-07 | End: 2021-09-24

## 2021-06-07 RX ORDER — MECLIZINE HCL 12.5 MG/1
TABLET ORAL EVERY 6 HOURS PRN
COMMUNITY
Start: 2021-04-28

## 2021-06-07 NOTE — PATIENT INSTRUCTIONS
Obesity, Adult  Obesity is the condition of having too much total body fat. Being overweight or obese means that your weight is greater than what is considered healthy for your body size. Obesity is determined by a measurement called BMI. BMI is an estimate of body fat and is calculated from height and weight. For adults, a BMI of 30 or higher is considered obese.  Obesity can lead to other health concerns and major illnesses, including:  · Stroke.  · Coronary artery disease (CAD).  · Type 2 diabetes.  · Some types of cancer, including cancers of the colon, breast, uterus, and gallbladder.  · Osteoarthritis.  · High blood pressure (hypertension).  · High cholesterol.  · Sleep apnea.  · Gallbladder stones.  · Infertility problems.  What are the causes?  Common causes of this condition include:  · Eating daily meals that are high in calories, sugar, and fat.  · Being born with genes that may make you more likely to become obese.  · Having a medical condition that causes obesity, including:  ? Hypothyroidism.  ? Polycystic ovarian syndrome (PCOS).  ? Binge-eating disorder.  ? Cushing syndrome.  · Taking certain medicines, such as steroids, antidepressants, and seizure medicines.  · Not being physically active (sedentary lifestyle).  · Not getting enough sleep.  · Drinking high amounts of sugar-sweetened beverages, such as soft drinks.  What increases the risk?  The following factors may make you more likely to develop this condition:  · Having a family history of obesity.  · Being a woman of  descent.  · Being a man of  descent.  · Living in an area with limited access to:  ? [place], recreation centers, or sidewalks.  ? Healthy food choices, such as grocery stores and farmers' markets.  What are the signs or symptoms?  The main sign of this condition is having too much body fat.  How is this diagnosed?  This condition is diagnosed based on:  · Your BMI. If you are an adult with a BMI of 30 or  higher, you are considered obese.  · Your waist circumference. This measures the distance around your waistline.  · Your skinfold thickness. Your health care provider may gently pinch a fold of your skin and measure it.  You may have other tests to check for underlying conditions.  How is this treated?  Treatment for this condition often includes changing your lifestyle. Treatment may include some or all of the following:  · Dietary changes. This may include developing a healthy meal plan.  · Regular physical activity. This may include activity that causes your heart to beat faster (aerobic exercise) and strength training. Work with your health care provider to design an exercise program that works for you.  · Medicine to help you lose weight if you are unable to lose 1 pound a week after 6 weeks of healthy eating and more physical activity.  · Treating conditions that cause the obesity (underlying conditions).  · Surgery. Surgical options may include gastric banding and gastric bypass. Surgery may be done if:  ? Other treatments have not helped to improve your condition.  ? You have a BMI of 40 or higher.  ? You have life-threatening health problems related to obesity.  Follow these instructions at home:  Eating and drinking    · Follow recommendations from your health care provider about what you eat and drink. Your health care provider may advise you to:  ? Limit fast food, sweets, and processed snack foods.  ? Choose low-fat options, such as low-fat milk instead of whole milk.  ? Eat 5 or more servings of fruits or vegetables every day.  ? Eat at home more often. This gives you more control over what you eat.  ? Choose healthy foods when you eat out.  ? Learn to read food labels. This will help you understand how much food is considered 1 serving.  ? Learn what a healthy serving size is.  ? Keep low-fat snacks available.  ? Limit sugary drinks, such as soda, fruit juice, sweetened iced tea, and flavored  milk.  · Drink enough water to keep your urine pale yellow.  · Do not follow a fad diet. Fad diets can be unhealthy and even dangerous.  Physical activity  · Exercise regularly, as told by your health care provider.  ? Most adults should get up to 150 minutes of moderate-intensity exercise every week.  ? Ask your health care provider what types of exercise are safe for you and how often you should exercise.  · Warm up and stretch before being active.  · Cool down and stretch after being active.  · Rest between periods of activity.  Lifestyle  · Work with your health care provider and a dietitian to set a weight-loss goal that is healthy and reasonable for you.  · Limit your screen time.  · Find ways to reward yourself that do not involve food.  · Do not drink alcohol if:  ? Your health care provider tells you not to drink.  ? You are pregnant, may be pregnant, or are planning to become pregnant.  · If you drink alcohol:  ? Limit how much you use to:  § 0-1 drink a day for women.  § 0-2 drinks a day for men.  ? Be aware of how much alcohol is in your drink. In the [place] one drink equals one 12 oz bottle of beer (355 mL), one 5 oz glass of wine (148 mL), or one 1½ oz glass of hard liquor (44 mL).  General instructions  · Keep a weight-loss journal to keep track of the food you eat and how much exercise you get.  · Take over-the-counter and prescription medicines only as told by your health care provider.  · Take vitamins and supplements only as told by your health care provider.  · Consider joining a support group. Your health care provider may be able to recommend a support group.  · Keep all follow-up visits as told by your health care provider. This is important.  Contact a health care provider if:  · You are unable to meet your weight loss goal after 6 weeks of dietary and lifestyle changes.  Get help right away if you are having:  · Trouble breathing.  · Suicidal thoughts or behaviors.  Summary  · Obesity is the  condition of having too much total body fat.  · Being overweight or obese means that your weight is greater than what is considered healthy for your body size.  · Work with your health care provider and a dietitian to set a weight-loss goal that is healthy and reasonable for you.  · Exercise regularly, as told by your health care provider. Ask your health care provider what types of exercise are safe for you and how often you should exercise.  This information is not intended to replace advice given to you by your health care provider. Make sure you discuss any questions you have with your health care provider.  Document Revised: 08/22/2019 Document Reviewed: 08/22/2019  [employer] Patient Education © 2021 [employer]  MyPlate from [employer]    MyPlate is an outline of a general healthy diet based on the 2010 Dietary Guidelines for Americans, from the [place] Department of Agriculture (USDA). It sets guidelines for how much food you should eat from each food group based on your age, sex, and level of physical activity.  What are tips for following MyPlate?  To follow MyPlate recommendations:  · Eat a wide variety of fruits and vegetables, grains, and protein foods.  · Serve smaller portions and eat less food throughout the day.  · Limit portion sizes to avoid overeating.  · Enjoy your food.  · Get at least 150 minutes of exercise every week. This is about 30 minutes each day, 5 or more days per week.  It can be difficult to have every meal look like MyPlate. Think about MyPlate as eating guidelines for an entire day, rather than each individual meal.  Fruits and vegetables  · Make half of your plate fruits and vegetables.  · Eat many different colors of fruits and vegetables each day.  · For a 2,000 calorie daily food plan, eat:  ? 2½ cups of vegetables every day.  ? 2 cups of fruit every day.  · 1 cup is equal to:  ? 1 cup raw or cooked vegetables.  ? 1 cup raw fruit.  ? 1 medium-sized orange, apple, or banana.  ? 1 cup  100% fruit or vegetable juice.  ? 2 cups raw leafy greens, such as lettuce, spinach, or kale.  ? ½ cup dried fruit.  Grains  · One fourth of your plate should be grains.  · Make at least half of the grains you eat each day whole grains.  · For a 2,000 calorie daily food plan, eat 6 oz of grains every day.  · 1 oz is equal to:  ? 1 slice bread.  ? 1 cup cereal.  ? ½ cup cooked rice, cereal, or pasta.  Protein  · One fourth of your plate should be protein.  · Eat a wide variety of protein foods, including meat, poultry, fish, eggs, beans, nuts, and tofu.  · For a 2,000 calorie daily food plan, eat 5½ oz of protein every day.  · 1 oz is equal to:  ? 1 oz meat, poultry, or fish.  ? ¼ cup cooked beans.  ? 1 egg.  ? ½ oz nuts or seeds.  ? 1 Tbsp peanut butter.  Dairy  · Drink fat-free or low-fat (1%) milk.  · Eat or drink dairy as a side to meals.  · For a 2,000 calorie daily food plan, eat or drink 3 cups of dairy every day.  · 1 cup is equal to:  ? 1 cup milk, yogurt, cottage cheese, or soy milk (soy beverage).  ? 2 oz processed cheese.  ? 1½ oz natural cheese.  Fats, oils, salt, and sugars  · Only small amounts of oils are recommended.  · Avoid foods that are high in calories and low in nutritional value (empty calories), like foods high in fat or added sugars.  · Choose foods that are low in salt (sodium). Choose foods that have less than 140 milligrams (mg) of sodium per serving.  · Drink water instead of sugary drinks. Drink enough water each day to keep your urine pale yellow.  Where to find support  · Work with your health care provider or a nutrition specialist (dietitian) to develop a customized eating plan that is right for you.  · Download an sabrina (mobile application) to help you track your daily food intake.  Where to find more information  · Go to ChooseMyPlate.gov for more information.  Summary  · MyPlate is a general guideline for healthy eating from the USDA. It is based on the 2010 Dietary Guidelines for  Americans.  · In general, fruits and vegetables should take up ½ of your plate, grains should take up ¼ of your plate, and protein should take up ¼ of your plate.  This information is not intended to replace advice given to you by your health care provider. Make sure you discuss any questions you have with your health care provider.  Document Revised: 05/21/2020 Document Reviewed: 03/19/2018  [employer] Patient Education © 2021 [employer]

## 2021-06-07 NOTE — PROGRESS NOTES
Subjective   Follow up, hypertension  Susan Page is a 76 y.o. female who presents to day for Follow-up (Here for testing f/u), Coronary Artery Disease, Hyperlipidemia, Hypertension, and Sleep Apnea.    CHIEF COMPLIANT  Chief Complaint   Patient presents with   • Follow-up     Here for testing f/u   • Coronary Artery Disease   • Hyperlipidemia   • Hypertension   • Sleep Apnea     Problem list:     1. CAD  1.1 [facility], 9-, EF 50-55%, LAD LIs, nl. RCA, NL. LCx  2. SSS  2. HX MI in past  4. HTN  5. Hyperlipidemia  6. PILAR, uses C-PAP, followed by Dr. Collins.  7. Asthma  8. GERD  9. NIDDM      Problem List Items Addressed This Visit        Cardiac and Vasculature    Coronary artery disease due to calcified coronary lesion - Primary    Relevant Medications    metoprolol tartrate (LOPRESSOR) 100 MG tablet    amLODIPine (NORVASC) 10 MG tablet    nitroglycerin (NITROLINGUAL) 0.4 MG/SPRAY spray    Old MI (myocardial infarction)    Relevant Medications    metoprolol tartrate (LOPRESSOR) 100 MG tablet    amLODIPine (NORVASC) 10 MG tablet    nitroglycerin (NITROLINGUAL) 0.4 MG/SPRAY spray    SSS (sick sinus syndrome) (CMS/HCC)    Relevant Medications    metoprolol tartrate (LOPRESSOR) 100 MG tablet    amLODIPine (NORVASC) 10 MG tablet    nitroglycerin (NITROLINGUAL) 0.4 MG/SPRAY spray    Essential hypertension    Relevant Medications    lisinopril (PRINIVIL,ZESTRIL) 40 MG tablet    metoprolol tartrate (LOPRESSOR) 100 MG tablet    amLODIPine (NORVASC) 10 MG tablet    furosemide (LASIX) 20 MG tablet    Mixed hyperlipidemia    Relevant Medications    rosuvastatin (CRESTOR) 20 MG tablet       Pulmonary and Pneumonias    Shortness of breath       Sleep    PILAR on CPAP       Symptoms and Signs    Edema leg    Syncope and collapse          HPI  Since she was last seen last time she had no further syncopal episodes with no dizziness she continues to have some shortness of breath on mild exertion and mild lower extremity edema no  chest pain no palpitations she says she is using CPAP every night                      PRIOR MEDS  Current Outpatient Medications on File Prior to Visit   Medication Sig Dispense Refill   • allopurinol (ZYLOPRIM) 300 MG tablet Take 300 mg by mouth Daily.     • aspirin 325 MG EC tablet Take 325 mg by mouth Daily. Plus takes an 81 mg Nightly for sleep     • cetirizine (zyrTEC) 10 MG tablet Take 10 mg by mouth Daily.     • HYDROcodone-acetaminophen (NORCO) 5-325 MG per tablet prn     • insulin glargine (LANTUS) 100 UNIT/ML injection Inject 32 Units under the skin into the appropriate area as directed 2 (Two) Times a Day.     • meclizine (ANTIVERT) 12.5 MG tablet Daily.     • Multi Vitamin tablet tablet Daily.     • vitamin B-12 (CYANOCOBALAMIN) 1000 MCG tablet Take 1,000 mcg by mouth Daily.     • [DISCONTINUED] amLODIPine (NORVASC) 10 MG tablet Take 10 mg by mouth Daily.     • [DISCONTINUED] lisinopril (PRINIVIL,ZESTRIL) 40 MG tablet Take 40 mg by mouth Daily.     • [DISCONTINUED] metoprolol tartrate (LOPRESSOR) 100 MG tablet Take 100 mg by mouth 2 (Two) Times a Day.     • [DISCONTINUED] rosuvastatin (CRESTOR) 20 MG tablet Take 20 mg by mouth Every Night.     • [DISCONTINUED] furosemide (LASIX) 20 MG tablet Take 20 mg by mouth Daily.     • [DISCONTINUED] Multiple Vitamins-Minerals (MULTIVITAMIN ADULTS PO) Take  by mouth Daily.     • [DISCONTINUED] nitroglycerin (NITROLINGUAL) 0.4 MG/SPRAY spray Place 1 spray under the tongue Every 5 (Five) Minutes As Needed.     • [DISCONTINUED] potassium chloride (K-DUR) 10 MEQ CR tablet Take 10 mEq by mouth Daily. With Lasix       No current facility-administered medications on file prior to visit.       ALLERGIES  Codeine    HISTORY  Past Medical History:   Diagnosis Date   • Asthma    • CAD (coronary artery disease)    • Diabetes mellitus (CMS/HCC)    • Edema    • GERD (gastroesophageal reflux disease)    • HTN (hypertension)    • Hyperlipidemia    • Old MI (myocardial infarction)   "  • PILAR on CPAP    • Shortness of breath    • SSS (sick sinus syndrome) (CMS/HCC)        Social History     Socioeconomic History   • Marital status:      Spouse name: Not on file   • Number of children: Not on file   • Years of education: Not on file   • Highest education level: Not on file   Tobacco Use   • Smoking status: Never Smoker   • Smokeless tobacco: Never Used   Substance and Sexual Activity   • Alcohol use: Never   • Drug use: Never   • Sexual activity: Defer       Family History   Problem Relation Age of Onset   • No Known Problems Mother    • Asthma Father    • No Known Problems Brother        Review of Systems   Constitutional: Positive for fatigue.   HENT: Negative.    Eyes: Positive for visual disturbance (wears glasses).   Respiratory: Positive for shortness of breath.    Cardiovascular: Positive for leg swelling. Negative for chest pain and palpitations.   Gastrointestinal: Negative.    Endocrine: Negative.    Genitourinary: Negative.    Musculoskeletal: Positive for arthralgias, gait problem (ambulates with rolling walker) and myalgias.   Skin: Negative.    Allergic/Immunologic: Negative.    Neurological: Positive for dizziness, syncope (none since last visit) and light-headedness.   Hematological: Negative.    Psychiatric/Behavioral: Negative.        Objective     VITALS: /75 (BP Location: Left arm, Patient Position: Sitting)   Pulse 86   Ht 162 cm (63.78\")   Wt 91.4 kg (201 lb 6.4 oz)   SpO2 93%   BMI 34.81 kg/m²     LABS:   Lab Results (most recent)     None          IMAGING:   No Images in the past 120 days found..    EXAM:  Physical Exam  Vitals reviewed.   Constitutional:       Appearance: She is well-developed.   HENT:      Head: Normocephalic and atraumatic.   Eyes:      Pupils: Pupils are equal, round, and reactive to light.   Neck:      Thyroid: No thyromegaly.      Vascular: No JVD.   Cardiovascular:      Rate and Rhythm: Normal rate and regular rhythm.      Heart " sounds: Normal heart sounds. No murmur heard.   No friction rub. No gallop.    Pulmonary:      Effort: Pulmonary effort is normal. No respiratory distress.      Breath sounds: Normal breath sounds. No stridor. No wheezing or rales.   Chest:      Chest wall: No tenderness.   Musculoskeletal:         General: No tenderness or deformity.      Cervical back: Neck supple.   Skin:     General: Skin is warm and dry.   Neurological:      Mental Status: She is alert and oriented to person, place, and time.      Cranial Nerves: No cranial nerve deficit.      Coordination: Coordination normal.         Procedure   Procedures       Assessment/Plan     Diagnoses and all orders for this visit:    1. Coronary artery disease due to calcified coronary lesion (Primary)  -     nitroglycerin (NITROLINGUAL) 0.4 MG/SPRAY spray; Place 1 spray under the tongue Every 5 (Five) Minutes As Needed for Chest Pain.  Dispense: 100 each; Refill: 3    2. Essential hypertension  -     lisinopril (PRINIVIL,ZESTRIL) 40 MG tablet; Take 1 tablet by mouth Daily.  Dispense: 90 tablet; Refill: 1  -     metoprolol tartrate (LOPRESSOR) 100 MG tablet; Take 1 tablet by mouth 2 (Two) Times a Day.  Dispense: 180 tablet; Refill: 1  -     amLODIPine (NORVASC) 10 MG tablet; Take 1 tablet by mouth Daily.  Dispense: 90 tablet; Refill: 1  -     furosemide (LASIX) 20 MG tablet; Take 1 tablet by mouth Daily.  Dispense: 90 tablet; Refill: 1    3. Mixed hyperlipidemia  -     rosuvastatin (CRESTOR) 20 MG tablet; Take 1 tablet by mouth Every Night.  Dispense: 90 tablet; Refill: 1    4. Old MI (myocardial infarction)    5. SSS (sick sinus syndrome) (CMS/HCC)    6. Shortness of breath    7. PILAR on CPAP    8. Edema leg    9. Syncope and collapse    1.  She has no chest pain and no further syncopal episodes we discussed the results of the event monitor which showed no arrhythmias and also discussed the results of the echocardiogram which showed diastolic dysfunction and right  ventricular lodgment she is actually started on oxygen also by Dr. Schultz which is helping her a lot is advised to eat a low-salt diet  2.  Her blood pressure is elevated again today she has not been measuring her blood pressure at home advised her to take that at home and I am not going to be at this moment very aggressive with blood pressure control because of the syncopal episodes before which could be related to orthostatic hypotension so we will monitor her blood pressure for now  3.  She has been use CPAP every night Dr. Schultz is monitoring  4.  I have reviewed her labs from April at her doctor's office which showed elevated fasting glucose slightly evaded creatinine and elevated triglycerides, with low HDL and good LDL level we discussed that diet and exercise discussed she will see also Dr. Maharaj her primary doctor for management of her sugar  Return in about 3 months (around 9/7/2021).            ACP discussion was held with the patient during this visit. Patient does not have an advance directive, declines further assistance.     MEDS ORDERED DURING VISIT:  New Medications Ordered This Visit   Medications   • lisinopril (PRINIVIL,ZESTRIL) 40 MG tablet     Sig: Take 1 tablet by mouth Daily.     Dispense:  90 tablet     Refill:  1   • metoprolol tartrate (LOPRESSOR) 100 MG tablet     Sig: Take 1 tablet by mouth 2 (Two) Times a Day.     Dispense:  180 tablet     Refill:  1   • rosuvastatin (CRESTOR) 20 MG tablet     Sig: Take 1 tablet by mouth Every Night.     Dispense:  90 tablet     Refill:  1   • amLODIPine (NORVASC) 10 MG tablet     Sig: Take 1 tablet by mouth Daily.     Dispense:  90 tablet     Refill:  1   • nitroglycerin (NITROLINGUAL) 0.4 MG/SPRAY spray     Sig: Place 1 spray under the tongue Every 5 (Five) Minutes As Needed for Chest Pain.     Dispense:  100 each     Refill:  3   • furosemide (LASIX) 20 MG tablet     Sig: Take 1 tablet by mouth Daily.     Dispense:  90 tablet      Refill:  1       As always, Yodit Ho MD  I appreciate very much the opportunity to participate in the cardiovascular care of your patients. Please do not hesitate to call me with any questions with regards to Susan Page evaluation and management.         This document has been electronically signed by Cassidy Villarreal MD  June 7, 2021 15:14 EDT

## 2021-09-24 ENCOUNTER — OFFICE VISIT (OUTPATIENT)
Dept: CARDIOLOGY | Facility: CLINIC | Age: 76
End: 2021-09-24

## 2021-09-24 VITALS
BODY MASS INDEX: 32.61 KG/M2 | HEIGHT: 64 IN | HEART RATE: 77 BPM | WEIGHT: 191 LBS | DIASTOLIC BLOOD PRESSURE: 65 MMHG | OXYGEN SATURATION: 93 % | SYSTOLIC BLOOD PRESSURE: 98 MMHG

## 2021-09-24 DIAGNOSIS — G47.33 OSA ON CPAP: ICD-10-CM

## 2021-09-24 DIAGNOSIS — I10 ESSENTIAL HYPERTENSION: Primary | ICD-10-CM

## 2021-09-24 DIAGNOSIS — Z99.89 OSA ON CPAP: ICD-10-CM

## 2021-09-24 DIAGNOSIS — R06.02 SHORTNESS OF BREATH: ICD-10-CM

## 2021-09-24 DIAGNOSIS — I25.2 OLD MI (MYOCARDIAL INFARCTION): ICD-10-CM

## 2021-09-24 DIAGNOSIS — E78.2 MIXED HYPERLIPIDEMIA: ICD-10-CM

## 2021-09-24 DIAGNOSIS — I25.84 CORONARY ARTERY DISEASE DUE TO CALCIFIED CORONARY LESION: ICD-10-CM

## 2021-09-24 DIAGNOSIS — I49.5 SSS (SICK SINUS SYNDROME) (HCC): ICD-10-CM

## 2021-09-24 DIAGNOSIS — I25.10 CORONARY ARTERY DISEASE DUE TO CALCIFIED CORONARY LESION: ICD-10-CM

## 2021-09-24 PROCEDURE — 99214 OFFICE O/P EST MOD 30 MIN: CPT | Performed by: SPECIALIST

## 2021-09-24 RX ORDER — FUROSEMIDE 40 MG/1
40 TABLET ORAL DAILY
Qty: 90 TABLET | Refills: 1 | Status: SHIPPED | OUTPATIENT
Start: 2021-09-24 | End: 2022-01-12 | Stop reason: SDUPTHER

## 2021-09-24 RX ORDER — CYANOCOBALAMIN (VITAMIN B-12) 1000 MCG
TABLET, EXTENDED RELEASE ORAL DAILY
COMMUNITY
Start: 2021-09-06 | End: 2021-09-24 | Stop reason: SDUPTHER

## 2021-09-24 RX ORDER — NITROGLYCERIN 400 UG/1
1 SPRAY ORAL
Qty: 100 EACH | Refills: 3 | Status: SHIPPED | OUTPATIENT
Start: 2021-09-24 | End: 2022-01-12 | Stop reason: SDUPTHER

## 2021-09-24 RX ORDER — METOPROLOL TARTRATE 100 MG/1
100 TABLET ORAL 2 TIMES DAILY
Qty: 180 TABLET | Refills: 1 | Status: SHIPPED | OUTPATIENT
Start: 2021-09-24 | End: 2022-01-12 | Stop reason: SDUPTHER

## 2021-09-24 RX ORDER — SERTRALINE HYDROCHLORIDE 25 MG/1
TABLET, FILM COATED ORAL DAILY
COMMUNITY
Start: 2021-09-06

## 2021-09-24 RX ORDER — LISINOPRIL 20 MG/1
40 TABLET ORAL DAILY
Qty: 90 TABLET | Refills: 1 | Status: SHIPPED | OUTPATIENT
Start: 2021-09-24 | End: 2021-09-24 | Stop reason: SDUPTHER

## 2021-09-24 RX ORDER — INSULIN GLARGINE 100 [IU]/ML
32 INJECTION, SOLUTION SUBCUTANEOUS 2 TIMES DAILY
COMMUNITY
Start: 2021-09-02

## 2021-09-24 RX ORDER — FUROSEMIDE 40 MG/1
TABLET ORAL DAILY
COMMUNITY
Start: 2021-09-06 | End: 2021-09-24 | Stop reason: SDUPTHER

## 2021-09-24 RX ORDER — ROSUVASTATIN CALCIUM 20 MG/1
20 TABLET, COATED ORAL NIGHTLY
Qty: 90 TABLET | Refills: 1 | Status: SHIPPED | OUTPATIENT
Start: 2021-09-24 | End: 2022-01-12 | Stop reason: SDUPTHER

## 2021-09-24 RX ORDER — LISINOPRIL 20 MG/1
40 TABLET ORAL DAILY
Qty: 90 TABLET | Refills: 1 | Status: SHIPPED | OUTPATIENT
Start: 2021-09-24 | End: 2022-01-12 | Stop reason: DRUGHIGH

## 2021-09-24 NOTE — PROGRESS NOTES
Subjective   Follow up, hypertension  Susan Page is a 76 y.o. female who presents to day for Follow-up (Here for 3 mo. f/u), Coronary Artery Disease, Hyperlipidemia, Hypertension, and Sleep Apnea.    CHIEF COMPLIANT  Chief Complaint   Patient presents with   • Follow-up     Here for 3 mo. f/u   • Coronary Artery Disease   • Hyperlipidemia   • Hypertension   • Sleep Apnea     Problem list:     1. CAD  1.1 [facility], 9-, EF 50-55%, LAD LIs, nl. RCA, NL. LCx  2. SSS  2. HX MI in past  4. HTN  5. Hyperlipidemia  6. PILAR, uses C-PAP, followed by Dr. Collins.  7. Asthma  8. GERD  9. NIDDM    Problem List Items Addressed This Visit        Cardiac and Vasculature    Coronary artery disease due to calcified coronary lesion    Relevant Medications    metoprolol tartrate (LOPRESSOR) 100 MG tablet    nitroglycerin (NITROLINGUAL) 0.4 MG/SPRAY spray    Old MI (myocardial infarction)    Relevant Medications    metoprolol tartrate (LOPRESSOR) 100 MG tablet    nitroglycerin (NITROLINGUAL) 0.4 MG/SPRAY spray    SSS (sick sinus syndrome) (CMS/HCC)    Relevant Medications    metoprolol tartrate (LOPRESSOR) 100 MG tablet    nitroglycerin (NITROLINGUAL) 0.4 MG/SPRAY spray    Essential hypertension - Primary    Relevant Medications    lisinopril (PRINIVIL,ZESTRIL) 20 MG tablet    metoprolol tartrate (LOPRESSOR) 100 MG tablet    furosemide (LASIX) 40 MG tablet    Mixed hyperlipidemia    Relevant Medications    rosuvastatin (CRESTOR) 20 MG tablet    Other Relevant Orders    Lipid Panel    Comprehensive Metabolic Panel       Pulmonary and Pneumonias    Shortness of breath       Sleep    PILAR on CPAP          HPI    Doing well, no chest pain, shortness of breath is better, has chronic LE edema, no palpitations, having intermittent diarrhea, using CPAP every night                  PRIOR MEDS  Current Outpatient Medications on File Prior to Visit   Medication Sig Dispense Refill   • allopurinol (ZYLOPRIM) 300 MG tablet Take 300 mg by mouth  Daily.     • aspirin 325 MG EC tablet Take 325 mg by mouth Daily. Plus takes an 81 mg Nightly for sleep     • cetirizine (zyrTEC) 10 MG tablet Take 10 mg by mouth Daily.     • Lantus SoloStar 100 UNIT/ML injection pen 32 Units 2 (Two) Times a Day.     • meclizine (ANTIVERT) 12.5 MG tablet Every 6 (Six) Hours As Needed.     • Multi Vitamin tablet tablet Daily.     • sertraline (ZOLOFT) 25 MG tablet Daily.     • vitamin B-12 (CYANOCOBALAMIN) 1000 MCG tablet Take 1,000 mcg by mouth Daily.     • [DISCONTINUED] furosemide (LASIX) 40 MG tablet Daily.     • [DISCONTINUED] lisinopril (PRINIVIL,ZESTRIL) 40 MG tablet Take 1 tablet by mouth Daily. 90 tablet 1   • [DISCONTINUED] metoprolol tartrate (LOPRESSOR) 100 MG tablet Take 1 tablet by mouth 2 (Two) Times a Day. 180 tablet 1   • [DISCONTINUED] rosuvastatin (CRESTOR) 20 MG tablet Take 1 tablet by mouth Every Night. 90 tablet 1   • [DISCONTINUED] amLODIPine (NORVASC) 10 MG tablet Take 1 tablet by mouth Daily. 90 tablet 1   • [DISCONTINUED] Cyanocobalamin (Vitamin B-12 ER) 1000 MCG tablet controlled-release Daily.     • [DISCONTINUED] furosemide (LASIX) 20 MG tablet Take 1 tablet by mouth Daily. 90 tablet 1   • [DISCONTINUED] HYDROcodone-acetaminophen (NORCO) 5-325 MG per tablet prn     • [DISCONTINUED] insulin glargine (LANTUS) 100 UNIT/ML injection Inject 32 Units under the skin into the appropriate area as directed 2 (Two) Times a Day.     • [DISCONTINUED] nitroglycerin (NITROLINGUAL) 0.4 MG/SPRAY spray Place 1 spray under the tongue Every 5 (Five) Minutes As Needed for Chest Pain. 100 each 3     No current facility-administered medications on file prior to visit.       ALLERGIES  Codeine    HISTORY  Past Medical History:   Diagnosis Date   • Asthma    • CAD (coronary artery disease)    • Diabetes mellitus (CMS/[facility])    • Edema    • GERD (gastroesophageal reflux disease)    • HTN (hypertension)    • Hyperlipidemia    • Old MI (myocardial infarction)    • PILAR on CPAP    •  "Shortness of breath    • SSS (sick sinus syndrome) (CMS/[facility])        Social History     Socioeconomic History   • Marital status:      Spouse name: Not on file   • Number of children: Not on file   • Years of education: Not on file   • Highest education level: Not on file   Tobacco Use   • Smoking status: Never Smoker   • Smokeless tobacco: Never Used   Substance and Sexual Activity   • Alcohol use: Never   • Drug use: Never   • Sexual activity: Defer       Family History   Problem Relation Age of Onset   • No Known Problems Mother    • Asthma Father    • No Known Problems Brother        Review of Systems   Constitutional: Positive for fatigue.   HENT: Negative.    Eyes: Positive for visual disturbance (glasses).   Respiratory: Positive for shortness of breath (usual).    Cardiovascular: Positive for leg swelling. Negative for chest pain and palpitations.   Gastrointestinal: Positive for diarrhea.   Endocrine: Negative.    Genitourinary: Negative.    Musculoskeletal: Positive for arthralgias and myalgias.   Skin: Negative.    Allergic/Immunologic: Positive for environmental allergies.   Neurological: Positive for dizziness and numbness (rt. arm). Negative for syncope.   Hematological: Bruises/bleeds easily.   Psychiatric/Behavioral: Positive for sleep disturbance (off and on).       Objective     VITALS: BP 98/65 (BP Location: Left arm, Patient Position: Sitting)   Pulse 77   Ht 162 cm (63.78\")   Wt 86.6 kg (191 lb)   SpO2 93%   BMI 33.01 kg/m²     LABS:   Lab Results (most recent)     None          IMAGING:   No Images in the past 120 days found..    EXAM:  Physical Exam  Vitals reviewed.   Constitutional:       Appearance: She is well-developed.   HENT:      Head: Normocephalic and atraumatic.   Eyes:      Pupils: Pupils are equal, round, and reactive to light.   Neck:      Thyroid: No thyromegaly.      Vascular: No JVD.   Cardiovascular:      Rate and Rhythm: Normal rate and regular rhythm.      Heart " sounds: Normal heart sounds. No murmur heard.   No friction rub. No gallop.       Comments: Trace edema  Pulmonary:      Effort: Pulmonary effort is normal. No respiratory distress.      Breath sounds: Normal breath sounds. No stridor. No wheezing or rales.   Chest:      Chest wall: No tenderness.   Musculoskeletal:         General: No tenderness or deformity.      Cervical back: Neck supple.   Skin:     General: Skin is warm and dry.   Neurological:      Mental Status: She is alert and oriented to person, place, and time.      Cranial Nerves: No cranial nerve deficit.      Coordination: Coordination normal.         Procedure   Procedures       Assessment/Plan     Diagnoses and all orders for this visit:    1. Essential hypertension (Primary)  -     Discontinue: lisinopril (PRINIVIL,ZESTRIL) 20 MG tablet; Take 2 tablets by mouth Daily.  Dispense: 90 tablet; Refill: 1  -     lisinopril (PRINIVIL,ZESTRIL) 20 MG tablet; Take 2 tablets by mouth Daily.  Dispense: 90 tablet; Refill: 1  -     metoprolol tartrate (LOPRESSOR) 100 MG tablet; Take 1 tablet by mouth 2 (Two) Times a Day.  Dispense: 180 tablet; Refill: 1  -     furosemide (LASIX) 40 MG tablet; Take 1 tablet by mouth Daily.  Dispense: 90 tablet; Refill: 1    2. SSS (sick sinus syndrome) (CMS/HCC)    3. Old MI (myocardial infarction)    4. Mixed hyperlipidemia  -     rosuvastatin (CRESTOR) 20 MG tablet; Take 1 tablet by mouth Every Night.  Dispense: 90 tablet; Refill: 1  -     Lipid Panel; Future  -     Comprehensive Metabolic Panel; Future    5. Coronary artery disease due to calcified coronary lesion  -     nitroglycerin (NITROLINGUAL) 0.4 MG/SPRAY spray; Place 1 spray under the tongue Every 5 (Five) Minutes As Needed for Chest Pain.  Dispense: 100 each; Refill: 3    6. Shortness of breath    7. PILAR on CPAP    1.  Her blood pressure is a little bit on the lower side but at home is mostly controlled I am going to cut down the lisinopril to 20 mg/day  2.  No recent  labs I will get lipid profile prior to next visit  3.  No chest pain we will continue with current management  4.  Her breathing is better continue current management  5.  She is using CPAP every night encouraged to continue    Return in about 3 months (around 12/24/2021).       ACP discussion was held with the patient during this visit. Patient does not have an advance directive, declines further assistance.          MEDS ORDERED DURING VISIT:  New Medications Ordered This Visit   Medications   • rosuvastatin (CRESTOR) 20 MG tablet     Sig: Take 1 tablet by mouth Every Night.     Dispense:  90 tablet     Refill:  1   • lisinopril (PRINIVIL,ZESTRIL) 20 MG tablet     Sig: Take 2 tablets by mouth Daily.     Dispense:  90 tablet     Refill:  1   • metoprolol tartrate (LOPRESSOR) 100 MG tablet     Sig: Take 1 tablet by mouth 2 (Two) Times a Day.     Dispense:  180 tablet     Refill:  1   • nitroglycerin (NITROLINGUAL) 0.4 MG/SPRAY spray     Sig: Place 1 spray under the tongue Every 5 (Five) Minutes As Needed for Chest Pain.     Dispense:  100 each     Refill:  3   • furosemide (LASIX) 40 MG tablet     Sig: Take 1 tablet by mouth Daily.     Dispense:  90 tablet     Refill:  1       As always, Yodit Ho MD  I appreciate very much the opportunity to participate in the cardiovascular care of your patients. Please do not hesitate to call me with any questions with regards to Susan Page evaluation and management.         This document has been electronically signed by Cassidy Villarreal MD  September 24, 2021 12:40 EDT

## 2022-01-12 ENCOUNTER — OFFICE VISIT (OUTPATIENT)
Dept: CARDIOLOGY | Facility: CLINIC | Age: 77
End: 2022-01-12

## 2022-01-12 VITALS
BODY MASS INDEX: 32.81 KG/M2 | HEART RATE: 74 BPM | WEIGHT: 192.2 LBS | SYSTOLIC BLOOD PRESSURE: 107 MMHG | OXYGEN SATURATION: 95 % | HEIGHT: 64 IN | DIASTOLIC BLOOD PRESSURE: 65 MMHG

## 2022-01-12 DIAGNOSIS — I49.5 SSS (SICK SINUS SYNDROME): ICD-10-CM

## 2022-01-12 DIAGNOSIS — I25.2 OLD MI (MYOCARDIAL INFARCTION): ICD-10-CM

## 2022-01-12 DIAGNOSIS — I10 ESSENTIAL HYPERTENSION: Primary | ICD-10-CM

## 2022-01-12 DIAGNOSIS — G47.33 OSA ON CPAP: ICD-10-CM

## 2022-01-12 DIAGNOSIS — I25.10 CORONARY ARTERY DISEASE DUE TO CALCIFIED CORONARY LESION: ICD-10-CM

## 2022-01-12 DIAGNOSIS — R06.02 SHORTNESS OF BREATH: ICD-10-CM

## 2022-01-12 DIAGNOSIS — E78.2 MIXED HYPERLIPIDEMIA: ICD-10-CM

## 2022-01-12 DIAGNOSIS — I25.84 CORONARY ARTERY DISEASE DUE TO CALCIFIED CORONARY LESION: ICD-10-CM

## 2022-01-12 DIAGNOSIS — Z99.89 OSA ON CPAP: ICD-10-CM

## 2022-01-12 PROCEDURE — 99214 OFFICE O/P EST MOD 30 MIN: CPT | Performed by: SPECIALIST

## 2022-01-12 RX ORDER — POLYETHYLENE GLYCOL 3350 17 G/17G
POWDER, FOR SOLUTION ORAL DAILY PRN
COMMUNITY
Start: 2021-10-19

## 2022-01-12 RX ORDER — NITROGLYCERIN 400 UG/1
1 SPRAY ORAL
Qty: 100 EACH | Refills: 3 | Status: SHIPPED | OUTPATIENT
Start: 2022-01-12 | End: 2023-02-15 | Stop reason: SDUPTHER

## 2022-01-12 RX ORDER — LISINOPRIL 40 MG/1
40 TABLET ORAL DAILY
Qty: 90 TABLET | Refills: 1 | Status: SHIPPED | OUTPATIENT
Start: 2022-01-12 | End: 2022-09-27

## 2022-01-12 RX ORDER — ROSUVASTATIN CALCIUM 20 MG/1
20 TABLET, COATED ORAL NIGHTLY
Qty: 90 TABLET | Refills: 1 | Status: SHIPPED | OUTPATIENT
Start: 2022-01-12 | End: 2022-09-27

## 2022-01-12 RX ORDER — METOPROLOL TARTRATE 100 MG/1
100 TABLET ORAL 2 TIMES DAILY
Qty: 180 TABLET | Refills: 1 | Status: SHIPPED | OUTPATIENT
Start: 2022-01-12 | End: 2023-02-15 | Stop reason: SDUPTHER

## 2022-01-12 RX ORDER — LISINOPRIL 40 MG/1
40 TABLET ORAL DAILY
COMMUNITY
Start: 2022-01-10 | End: 2022-01-12 | Stop reason: SDUPTHER

## 2022-01-12 RX ORDER — FUROSEMIDE 40 MG/1
40 TABLET ORAL DAILY
Qty: 90 TABLET | Refills: 1 | Status: SHIPPED | OUTPATIENT
Start: 2022-01-12 | End: 2022-09-27

## 2022-01-12 RX ORDER — DICYCLOMINE HCL 20 MG
TABLET ORAL 3 TIMES DAILY PRN
COMMUNITY
Start: 2021-11-23

## 2022-01-12 NOTE — PROGRESS NOTES
Subjective   Follow up, hypertension  Susan Page is a 76 y.o. female who presents to day for Follow-up (Here for 3 mo. f/u), Coronary Artery Disease, Hypertension, Hyperlipidemia, Sleep Apnea, and Shortness of Breath.    CHIEF COMPLIANT  Chief Complaint   Patient presents with   • Follow-up     Here for 3 mo. f/u   • Coronary Artery Disease   • Hypertension   • Hyperlipidemia   • Sleep Apnea   • Shortness of Breath     Problem list:     1. CAD  1.1 [facility], 9-, EF 50-55%, LAD LIs, nl. RCA, NL. LCx  2. SSS  2. HX MI in past  4. HTN  5. Hyperlipidemia  6. PILAR, uses C-PAP, followed by Dr. Collins.  7. Asthma  8. GERD  9. NIDDM    Problem List Items Addressed This Visit        Cardiac and Vasculature    Coronary artery disease due to calcified coronary lesion    Relevant Medications    metoprolol tartrate (LOPRESSOR) 100 MG tablet    nitroglycerin (NITROLINGUAL) 0.4 MG/SPRAY spray    Old MI (myocardial infarction)    Relevant Medications    metoprolol tartrate (LOPRESSOR) 100 MG tablet    nitroglycerin (NITROLINGUAL) 0.4 MG/SPRAY spray    SSS (sick sinus syndrome) (HCC)    Relevant Medications    metoprolol tartrate (LOPRESSOR) 100 MG tablet    nitroglycerin (NITROLINGUAL) 0.4 MG/SPRAY spray    Essential hypertension - Primary    Relevant Medications    furosemide (LASIX) 40 MG tablet    metoprolol tartrate (LOPRESSOR) 100 MG tablet    lisinopril (PRINIVIL,ZESTRIL) 40 MG tablet    Mixed hyperlipidemia    Relevant Medications    rosuvastatin (CRESTOR) 20 MG tablet    Other Relevant Orders    Lipid Panel    Comprehensive Metabolic Panel       Pulmonary and Pneumonias    Shortness of breath       Sleep    PILAR on CPAP          HPI    Has been stable she denies any chest pain she does have stable shortness of breath on exertion class II she has mild lower extremity edema intermittently and no palpitations.  CPAP every night                PRIOR MEDS  Current Outpatient Medications on File Prior to Visit   Medication Sig  Dispense Refill   • allopurinol (ZYLOPRIM) 300 MG tablet Take 300 mg by mouth Daily.     • aspirin 325 MG EC tablet Take 325 mg by mouth Daily.     • cetirizine (zyrTEC) 10 MG tablet Take 10 mg by mouth Daily.     • dicyclomine (BENTYL) 20 MG tablet 3 (Three) Times a Day As Needed.     • Lantus SoloStar 100 UNIT/ML injection pen 32 Units 2 (Two) Times a Day.     • meclizine (ANTIVERT) 12.5 MG tablet Every 6 (Six) Hours As Needed.     • Multi Vitamin tablet tablet Daily.     • polyethylene glycol (MIRALAX) 17 GM/SCOOP powder Daily As Needed.     • sertraline (ZOLOFT) 25 MG tablet Daily.     • vitamin B-12 (CYANOCOBALAMIN) 1000 MCG tablet Take 1,000 mcg by mouth Daily.     • [DISCONTINUED] furosemide (LASIX) 40 MG tablet Take 1 tablet by mouth Daily. 90 tablet 1   • [DISCONTINUED] lisinopril (PRINIVIL,ZESTRIL) 40 MG tablet 40 mg Daily.     • [DISCONTINUED] metoprolol tartrate (LOPRESSOR) 100 MG tablet Take 1 tablet by mouth 2 (Two) Times a Day. 180 tablet 1   • [DISCONTINUED] rosuvastatin (CRESTOR) 20 MG tablet Take 1 tablet by mouth Every Night. 90 tablet 1   • [DISCONTINUED] lisinopril (PRINIVIL,ZESTRIL) 20 MG tablet Take 2 tablets by mouth Daily. 90 tablet 1   • [DISCONTINUED] nitroglycerin (NITROLINGUAL) 0.4 MG/SPRAY spray Place 1 spray under the tongue Every 5 (Five) Minutes As Needed for Chest Pain. 100 each 3     No current facility-administered medications on file prior to visit.       ALLERGIES  Codeine    HISTORY  Past Medical History:   Diagnosis Date   • Asthma    • CAD (coronary artery disease)    • Diabetes mellitus (HCC)    • Edema    • GERD (gastroesophageal reflux disease)    • HTN (hypertension)    • Hyperlipidemia    • Old MI (myocardial infarction)    • PILAR on CPAP    • Shortness of breath    • SSS (sick sinus syndrome) ([facility])        Social History     Socioeconomic History   • Marital status:    Tobacco Use   • Smoking status: Never Smoker   • Smokeless tobacco: Never Used   Substance and  "Sexual Activity   • Alcohol use: Never   • Drug use: Never   • Sexual activity: Defer       Family History   Problem Relation Age of Onset   • No Known Problems Mother    • Asthma Father    • No Known Problems Brother        Review of Systems   Constitutional: Positive for fatigue (easily occas. ).   HENT: Negative.    Eyes: Positive for visual disturbance (glasses).   Respiratory: Positive for shortness of breath.    Cardiovascular: Positive for leg swelling. Negative for chest pain and palpitations.   Gastrointestinal: Positive for constipation.   Endocrine: Negative.    Genitourinary: Negative.    Musculoskeletal: Positive for arthralgias and myalgias.   Skin: Negative.    Allergic/Immunologic: Positive for environmental allergies.   Neurological: Positive for dizziness and light-headedness. Negative for syncope (none recent, in past).   Hematological: Bruises/bleeds easily (bruise).   Psychiatric/Behavioral: Positive for sleep disturbance (off and on).       Objective     VITALS: /65 (BP Location: Left arm, Patient Position: Sitting)   Pulse 74   Ht 162 cm (63.78\")   Wt 87.2 kg (192 lb 3.2 oz)   SpO2 95%   BMI 33.22 kg/m²     LABS:   Lab Results (most recent)     None          IMAGING:   No Images in the past 120 days found..    EXAM:  Physical Exam  Vitals reviewed.   Constitutional:       Appearance: She is well-developed.   HENT:      Head: Normocephalic and atraumatic.   Eyes:      Pupils: Pupils are equal, round, and reactive to light.   Neck:      Thyroid: No thyromegaly.      Vascular: No JVD.   Cardiovascular:      Rate and Rhythm: Normal rate and regular rhythm.      Heart sounds: Normal heart sounds. No murmur heard.  No friction rub. No gallop.       Comments: Trace edema  Pulmonary:      Effort: Pulmonary effort is normal. No respiratory distress.      Breath sounds: Normal breath sounds. No stridor. No wheezing or rales.   Chest:      Chest wall: No tenderness.   Musculoskeletal:         " General: No tenderness or deformity.      Cervical back: Neck supple.   Skin:     General: Skin is warm and dry.   Neurological:      Mental Status: She is alert and oriented to person, place, and time.      Cranial Nerves: No cranial nerve deficit.      Coordination: Coordination normal.         Procedure   Procedures       Assessment/Plan     Diagnoses and all orders for this visit:    1. Essential hypertension (Primary)  -     furosemide (LASIX) 40 MG tablet; Take 1 tablet by mouth Daily.  Dispense: 90 tablet; Refill: 1  -     metoprolol tartrate (LOPRESSOR) 100 MG tablet; Take 1 tablet by mouth 2 (Two) Times a Day.  Dispense: 180 tablet; Refill: 1  -     lisinopril (PRINIVIL,ZESTRIL) 40 MG tablet; Take 1 tablet by mouth Daily.  Dispense: 90 tablet; Refill: 1    2. SSS (sick sinus syndrome) (HCC)    3. Old MI (myocardial infarction)    4. Mixed hyperlipidemia  -     rosuvastatin (CRESTOR) 20 MG tablet; Take 1 tablet by mouth Every Night.  Dispense: 90 tablet; Refill: 1  -     Lipid Panel; Future  -     Comprehensive Metabolic Panel; Future    5. Coronary artery disease due to calcified coronary lesion  -     nitroglycerin (NITROLINGUAL) 0.4 MG/SPRAY spray; Place 1 spray under the tongue Every 5 (Five) Minutes As Needed for Chest Pain.  Dispense: 100 each; Refill: 3    6. Shortness of breath    7. PILAR on CPAP    1.  Her blood pressure is much better now is well controlled also monitored the blood pressure at home which is well controlled we will continue current medications  2.  I reviewed her labs which showed elevated fasting glucose and creatinine where she is 1.36 which is slightly worsened April last year also elevated triglycerides and low HDL we discussed this discussed on avoiding of carbohydrates  3.  No chest pain she is active we will continue current management  4.  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  Her  heart rate is in the 70s with no evidence of significant bradycardia continue current management  6.  She did not CPAP every night we will continue current    Return in about 6 months (around 7/12/2022).      ACP discussion was held with the patient during this visit. Patient does not have an advance directive, declines further assistance.          MEDS ORDERED DURING VISIT:  New Medications Ordered This Visit   Medications   • rosuvastatin (CRESTOR) 20 MG tablet     Sig: Take 1 tablet by mouth Every Night.     Dispense:  90 tablet     Refill:  1   • furosemide (LASIX) 40 MG tablet     Sig: Take 1 tablet by mouth Daily.     Dispense:  90 tablet     Refill:  1   • metoprolol tartrate (LOPRESSOR) 100 MG tablet     Sig: Take 1 tablet by mouth 2 (Two) Times a Day.     Dispense:  180 tablet     Refill:  1   • nitroglycerin (NITROLINGUAL) 0.4 MG/SPRAY spray     Sig: Place 1 spray under the tongue Every 5 (Five) Minutes As Needed for Chest Pain.     Dispense:  100 each     Refill:  3   • lisinopril (PRINIVIL,ZESTRIL) 40 MG tablet     Sig: Take 1 tablet by mouth Daily.     Dispense:  90 tablet     Refill:  1       As always, Yodit Ho MD  I appreciate very much the opportunity to participate in the cardiovascular care of your patients. Please do not hesitate to call me with any questions with regards to Susan Page evaluation and management.         This document has been electronically signed by Cassidy Villarreal MD  January 12, 2022 13:54 EST

## 2022-08-12 ENCOUNTER — OFFICE VISIT (OUTPATIENT)
Dept: CARDIOLOGY | Facility: CLINIC | Age: 77
End: 2022-08-12

## 2022-08-12 VITALS
DIASTOLIC BLOOD PRESSURE: 57 MMHG | BODY MASS INDEX: 33.29 KG/M2 | HEART RATE: 71 BPM | HEIGHT: 64 IN | WEIGHT: 195 LBS | OXYGEN SATURATION: 95 % | SYSTOLIC BLOOD PRESSURE: 118 MMHG

## 2022-08-12 DIAGNOSIS — E78.2 MIXED HYPERLIPIDEMIA: ICD-10-CM

## 2022-08-12 DIAGNOSIS — G47.33 OSA ON CPAP: ICD-10-CM

## 2022-08-12 DIAGNOSIS — I25.84 CORONARY ARTERY DISEASE DUE TO CALCIFIED CORONARY LESION: ICD-10-CM

## 2022-08-12 DIAGNOSIS — I49.5 SSS (SICK SINUS SYNDROME): ICD-10-CM

## 2022-08-12 DIAGNOSIS — R60.0 EDEMA LEG: ICD-10-CM

## 2022-08-12 DIAGNOSIS — I10 ESSENTIAL HYPERTENSION: Primary | ICD-10-CM

## 2022-08-12 DIAGNOSIS — I25.10 CORONARY ARTERY DISEASE DUE TO CALCIFIED CORONARY LESION: ICD-10-CM

## 2022-08-12 DIAGNOSIS — Z99.89 OSA ON CPAP: ICD-10-CM

## 2022-08-12 DIAGNOSIS — R06.02 SHORTNESS OF BREATH: ICD-10-CM

## 2022-08-12 PROCEDURE — 99214 OFFICE O/P EST MOD 30 MIN: CPT | Performed by: SPECIALIST

## 2022-08-12 RX ORDER — ASPIRIN 81 MG/1
81 TABLET ORAL DAILY
Qty: 90 TABLET | Refills: 1 | Status: SHIPPED | OUTPATIENT
Start: 2022-08-12

## 2022-08-12 NOTE — PROGRESS NOTES
Subjective   Follow up, hypertension, CAD  Susan Page is a 77 y.o. female who presents to day for Follow-up (Here for 6 mo. F/u), Coronary Artery Disease, Hyperlipidemia, Hypertension, Sleep Apnea, and Syncope.    CHIEF COMPLIANT  Chief Complaint   Patient presents with   • Follow-up     Here for 6 mo. F/u   • Coronary Artery Disease   • Hyperlipidemia   • Hypertension   • Sleep Apnea   • Syncope     Problem list:     1. CAD  1.1 [facility], 9-, EF 50-55%, LAD LIs, nl. RCA, NL. LCx  2. SSS  2. HX MI in past  4. HTN  5. Hyperlipidemia  6. PILAR, uses C-PAP, followed by Dr. Collins.  7. Asthma  8. GERD  9. NIDDM      Problem List Items Addressed This Visit        Cardiac and Vasculature    Coronary artery disease due to calcified coronary lesion    Relevant Medications    aspirin (aspirin) 81 MG EC tablet    SSS (sick sinus syndrome) (HCC)    Essential hypertension - Primary    Mixed hyperlipidemia    Relevant Orders    Lipid Panel    Comprehensive Metabolic Panel       Pulmonary and Pneumonias    Shortness of breath       Sleep    PILAR on CPAP       Symptoms and Signs    Edema leg          HPI  She said no new issues she is active with no chest pain but she does have stable shortness of breath moderate exertion no palpitation but she does have intermittent lower extremity edema she still using the CPAP every night                    PRIOR MEDS  Current Outpatient Medications on File Prior to Visit   Medication Sig Dispense Refill   • allopurinol (ZYLOPRIM) 300 MG tablet Take 300 mg by mouth Daily.     • cetirizine (zyrTEC) 10 MG tablet Take 10 mg by mouth Daily.     • dicyclomine (BENTYL) 20 MG tablet 3 (Three) Times a Day As Needed.     • furosemide (LASIX) 40 MG tablet Take 1 tablet by mouth Daily. 90 tablet 1   • Lantus SoloStar 100 UNIT/ML injection pen 32 Units 2 (Two) Times a Day.     • lisinopril (PRINIVIL,ZESTRIL) 40 MG tablet Take 1 tablet by mouth Daily. 90 tablet 1   • meclizine (ANTIVERT) 12.5 MG tablet  Every 6 (Six) Hours As Needed.     • metoprolol tartrate (LOPRESSOR) 100 MG tablet Take 1 tablet by mouth 2 (Two) Times a Day. 180 tablet 1   • Multi Vitamin tablet tablet Daily.     • polyethylene glycol (MIRALAX) 17 GM/SCOOP powder Daily As Needed.     • rosuvastatin (CRESTOR) 20 MG tablet Take 1 tablet by mouth Every Night. 90 tablet 1   • sertraline (ZOLOFT) 25 MG tablet Daily.     • vitamin B-12 (CYANOCOBALAMIN) 1000 MCG tablet Take 1,000 mcg by mouth Daily.     • [DISCONTINUED] aspirin 325 MG EC tablet Take 325 mg by mouth Daily.     • nitroglycerin (NITROLINGUAL) 0.4 MG/SPRAY spray Place 1 spray under the tongue Every 5 (Five) Minutes As Needed for Chest Pain. 100 each 3     No current facility-administered medications on file prior to visit.       ALLERGIES  Codeine    HISTORY  Past Medical History:   Diagnosis Date   • Asthma    • CAD (coronary artery disease)    • Diabetes mellitus ([facility])    • Edema    • GERD (gastroesophageal reflux disease)    • HTN (hypertension)    • Hyperlipidemia    • Old MI (myocardial infarction)    • PILAR on CPAP    • Shortness of breath    • SSS (sick sinus syndrome) ([facility])        Social History     Socioeconomic History   • Marital status:    Tobacco Use   • Smoking status: Never Smoker   • Smokeless tobacco: Never Used   Substance and Sexual Activity   • Alcohol use: Never   • Drug use: Never   • Sexual activity: Defer       Family History   Problem Relation Age of Onset   • No Known Problems Mother    • Asthma Father    • No Known Problems Brother        Review of Systems   Constitutional: Positive for fatigue.   HENT: Negative.    Eyes: Positive for visual disturbance (glasses).   Respiratory: Positive for shortness of breath.    Cardiovascular: Positive for leg swelling. Negative for chest pain and palpitations.   Gastrointestinal: Positive for constipation.   Endocrine: Negative.    Genitourinary: Negative.    Musculoskeletal: Positive for arthralgias and myalgias.  "  Skin: Negative.    Allergic/Immunologic: Positive for environmental allergies.   Neurological: Positive for dizziness. Negative for syncope.   Hematological: Bruises/bleeds easily.   Psychiatric/Behavioral: Positive for sleep disturbance (off and on).       Objective     VITALS: /57 (BP Location: Left arm, Patient Position: Sitting)   Pulse 71   Ht 162 cm (63.78\")   Wt 88.5 kg (195 lb)   SpO2 95%   BMI 33.70 kg/m²     LABS:   Lab Results (most recent)     None          IMAGING:   No Images in the past 120 days found..    EXAM:  Physical Exam  Vitals reviewed.   Constitutional:       Appearance: She is well-developed.   HENT:      Head: Normocephalic and atraumatic.   Eyes:      Pupils: Pupils are equal, round, and reactive to light.   Neck:      Thyroid: No thyromegaly.      Vascular: No JVD.   Cardiovascular:      Rate and Rhythm: Normal rate and regular rhythm.      Heart sounds: Normal heart sounds. No murmur heard.    No friction rub. No gallop.   Pulmonary:      Effort: Pulmonary effort is normal. No respiratory distress.      Breath sounds: Normal breath sounds. No stridor. No wheezing or rales.   Chest:      Chest wall: No tenderness.   Musculoskeletal:         General: No tenderness or deformity.      Cervical back: Neck supple.   Skin:     General: Skin is warm and dry.   Neurological:      Mental Status: She is alert and oriented to person, place, and time.      Cranial Nerves: No cranial nerve deficit.      Coordination: Coordination normal.         Procedure   Procedures       Assessment & Plan     Diagnoses and all orders for this visit:    1. Essential hypertension (Primary)    2. Coronary artery disease due to calcified coronary lesion  -     aspirin (aspirin) 81 MG EC tablet; Take 1 tablet by mouth Daily.  Dispense: 90 tablet; Refill: 1    3. Mixed hyperlipidemia  -     Lipid Panel; Future  -     Comprehensive Metabolic Panel; Future    4. SSS (sick sinus syndrome) (HCC)    5. Shortness " of breath    6. PILAR on CPAP    7. Edema leg    1.  Again her blood pressure is well controlled we will continue current management  2.  Regarding her coronary artery disease she is active with no chest pain continue current management  3.  She has intermittent shortness of breath but she is stable  4.  She is using CPAP every night  5.  I do not have any recent blood work I will try to get labs prior to next visit including lipid profile  6.  No bradycardia she continue current management  7.  We will switch aspirin to 81 mg/day  8.  Regarding to her trace edema advised her to eat low-salt diet    Return in about 6 months (around 2/12/2023).      Advance Care Planning   ACP discussion was held with the patient during this visit. Patient does not have an advance directive, declines further assistance.             MEDS ORDERED DURING VISIT:  New Medications Ordered This Visit   Medications   • aspirin (aspirin) 81 MG EC tablet     Sig: Take 1 tablet by mouth Daily.     Dispense:  90 tablet     Refill:  1       As always, Yodit Ho MD  I appreciate very much the opportunity to participate in the cardiovascular care of your patients. Please do not hesitate to call me with any questions with regards to Susan Page evaluation and management.         This document has been electronically signed by Cassidy Villarreal MD  August 12, 2022 12:38 EDT

## 2022-09-27 DIAGNOSIS — I10 ESSENTIAL HYPERTENSION: ICD-10-CM

## 2022-09-27 DIAGNOSIS — E78.2 MIXED HYPERLIPIDEMIA: ICD-10-CM

## 2022-09-27 RX ORDER — FUROSEMIDE 40 MG/1
40 TABLET ORAL DAILY
Qty: 90 TABLET | Refills: 1 | Status: SHIPPED | OUTPATIENT
Start: 2022-09-27 | End: 2023-02-15 | Stop reason: SDUPTHER

## 2022-09-27 RX ORDER — ROSUVASTATIN CALCIUM 20 MG/1
20 TABLET, COATED ORAL NIGHTLY
Qty: 90 TABLET | Refills: 1 | Status: SHIPPED | OUTPATIENT
Start: 2022-09-27 | End: 2023-02-15 | Stop reason: SDUPTHER

## 2022-09-27 RX ORDER — LISINOPRIL 40 MG/1
40 TABLET ORAL DAILY
Qty: 90 TABLET | Refills: 1 | Status: SHIPPED | OUTPATIENT
Start: 2022-09-27 | End: 2023-02-15 | Stop reason: SDUPTHER

## 2022-09-27 NOTE — TELEPHONE ENCOUNTER
Name from pharmacy: FUROSEMIDE 40 MG TABS 40 Tablet         Will file in chart as: furosemide (LASIX) 40 MG tablet    Sig: Take 1 tablet by mouth Daily.    Disp:  90 tablet    Refills:  1    Start: 9/27/2022    Class: Normal    Non-formulary For: Essential hypertension    Last ordered: 8 months ago by Cassidy Villarreal MD Last refill: 7/6/2022    Rx #: [id number]    Diuretics Protocol Failed 09/27/2022 10:51 AM   Protocol Details  Normal serum potassium in past 12 months    GFR> 30 ml/min in past year    No active pregnancy on record    Normal serum sodium in past 12 months    No positive pregnancy test in past 12 months    Recent or future visit with authorizing provider    Blood pressure on record       Name from pharmacy: LISINOPRIL 40 MG TABS 40 Tablet         Will file in chart as: lisinopril (PRINIVIL,ZESTRIL) 40 MG tablet    Sig: Take 1 tablet by mouth Daily.    Disp:  90 tablet    Refills:  1    Start: 9/27/2022    Class: Normal    For: Essential hypertension    Last ordered: 8 months ago by Cassidy Villarreal MD Last refill: 7/6/2022    Rx #: [id number]    ACE Inhibitors Protocol Failed 09/27/2022 10:51 AM   Protocol Details  Normal serum potassium in past 12 months    GFR greater than 30 mL/min in past 12 months    No active pregnancy on record    Blood pressure on record    No positive pregnancy test in past 12 months    Patient is not on Entresto    Patient is not on an ARB    Recent or future visit with authorizing provider       Name from pharmacy: ROSUVASTATIN CALCIUM 20 MG 20 Tablet         Will file in chart as: rosuvastatin (CRESTOR) 20 MG tablet    Sig: Take 1 tablet by mouth Every Night.    Disp:  90 tablet    Refills:  1    Start: 9/27/2022    Class: Normal    For: Mixed hyperlipidemia    Last ordered: 8 months ago by Cassidy Villarreal MD Last refill: 7/6/2022    Rx #: [id number]    HMG-CoA Reductase Inhibitors (Statins) Protocol Failed 09/27/2022 10:51 AM   Protocol Details  Lipid  panel in past 12 months    ALK Phos in past 12 months    ALT in past 12 months    AST in past 12 months    No active pregnancy on record    No positive pregnancy test in past 12 months    Recent or future visit with authorizing provider

## 2023-02-15 ENCOUNTER — OFFICE VISIT (OUTPATIENT)
Dept: CARDIOLOGY | Facility: CLINIC | Age: 78
End: 2023-02-15
Payer: MEDICARE

## 2023-02-15 VITALS
DIASTOLIC BLOOD PRESSURE: 68 MMHG | BODY MASS INDEX: 33.36 KG/M2 | OXYGEN SATURATION: 91 % | WEIGHT: 195.4 LBS | HEIGHT: 64 IN | SYSTOLIC BLOOD PRESSURE: 116 MMHG | HEART RATE: 71 BPM

## 2023-02-15 DIAGNOSIS — G47.33 OSA ON CPAP: ICD-10-CM

## 2023-02-15 DIAGNOSIS — I49.5 SSS (SICK SINUS SYNDROME): ICD-10-CM

## 2023-02-15 DIAGNOSIS — Z99.89 OSA ON CPAP: ICD-10-CM

## 2023-02-15 DIAGNOSIS — I25.10 CORONARY ARTERY DISEASE DUE TO CALCIFIED CORONARY LESION: ICD-10-CM

## 2023-02-15 DIAGNOSIS — I25.84 CORONARY ARTERY DISEASE DUE TO CALCIFIED CORONARY LESION: ICD-10-CM

## 2023-02-15 DIAGNOSIS — E78.2 MIXED HYPERLIPIDEMIA: ICD-10-CM

## 2023-02-15 DIAGNOSIS — Z01.810 PRE-OPERATIVE CARDIOVASCULAR EXAMINATION: Primary | ICD-10-CM

## 2023-02-15 DIAGNOSIS — R06.02 SHORTNESS OF BREATH: ICD-10-CM

## 2023-02-15 DIAGNOSIS — I10 ESSENTIAL HYPERTENSION: ICD-10-CM

## 2023-02-15 PROCEDURE — 99214 OFFICE O/P EST MOD 30 MIN: CPT | Performed by: SPECIALIST

## 2023-02-15 RX ORDER — ROSUVASTATIN CALCIUM 20 MG/1
20 TABLET, COATED ORAL NIGHTLY
Qty: 90 TABLET | Refills: 1 | Status: SHIPPED | OUTPATIENT
Start: 2023-02-15

## 2023-02-15 RX ORDER — LISINOPRIL 40 MG/1
40 TABLET ORAL DAILY
Qty: 90 TABLET | Refills: 1 | Status: SHIPPED | OUTPATIENT
Start: 2023-02-15

## 2023-02-15 RX ORDER — METOPROLOL TARTRATE 100 MG/1
100 TABLET ORAL 2 TIMES DAILY
Qty: 180 TABLET | Refills: 1 | Status: SHIPPED | OUTPATIENT
Start: 2023-02-15

## 2023-02-15 RX ORDER — FUROSEMIDE 40 MG/1
40 TABLET ORAL DAILY
Qty: 90 TABLET | Refills: 1 | Status: SHIPPED | OUTPATIENT
Start: 2023-02-15

## 2023-02-15 RX ORDER — ACETAMINOPHEN 325 MG/1
650 TABLET ORAL EVERY 6 HOURS PRN
COMMUNITY

## 2023-02-15 RX ORDER — NITROGLYCERIN 400 UG/1
1 SPRAY ORAL
Qty: 100 EACH | Refills: 3 | Status: SHIPPED | OUTPATIENT
Start: 2023-02-15 | End: 2023-02-28 | Stop reason: ALTCHOICE

## 2023-02-15 NOTE — PROGRESS NOTES
Subjective   Follow up, hypertension  Susan Page is a 77 y.o. female who presents to day for Follow-up (6mth).    CHIEF COMPLIANT  Chief Complaint   Patient presents with   • Follow-up     6mth       Active Problems:  Problem List Items Addressed This Visit        Cardiac and Vasculature    Coronary artery disease due to calcified coronary lesion    Relevant Medications    nitroglycerin (NITROLINGUAL) 0.4 MG/SPRAY spray    metoprolol tartrate (LOPRESSOR) 100 MG tablet    SSS (sick sinus syndrome) (HCC)    Relevant Medications    nitroglycerin (NITROLINGUAL) 0.4 MG/SPRAY spray    metoprolol tartrate (LOPRESSOR) 100 MG tablet    Essential hypertension    Relevant Medications    metoprolol tartrate (LOPRESSOR) 100 MG tablet    lisinopril (PRINIVIL,ZESTRIL) 40 MG tablet    furosemide (LASIX) 40 MG tablet    Mixed hyperlipidemia    Relevant Medications    rosuvastatin (CRESTOR) 20 MG tablet    Other Relevant Orders    Lipid Panel    Comprehensive Metabolic Panel    Pre-operative cardiovascular examination - Primary       Pulmonary and Pneumonias    Shortness of breath       Sleep    PILAR on CPAP       HPI  HPI  She has been doing well she will need apparently cataract surgery next week but otherwise from the cardiac standpoint she denies any chest pain she said her shortness of breath has improved since has been using the CPAP now regularly no palpitations she has occasional vertigo which has been longstanding she is but no actual dizziness she had intermittent lower extremity edema as mentioned above she has been using the CPAP every night  PRIOR MEDS  Current Outpatient Medications on File Prior to Visit   Medication Sig Dispense Refill   • acetaminophen (TYLENOL) 325 MG tablet Take 650 mg by mouth Every 6 (Six) Hours As Needed for Mild Pain.     • allopurinol (ZYLOPRIM) 300 MG tablet Take 300 mg by mouth Daily.     • aspirin (aspirin) 81 MG EC tablet Take 1 tablet by mouth Daily. 90 tablet 1   • cetirizine (zyrTEC)  10 MG tablet Take 10 mg by mouth Daily.     • dicyclomine (BENTYL) 20 MG tablet 3 (Three) Times a Day As Needed.     • Lantus SoloStar 100 UNIT/ML injection pen 32 Units 2 (Two) Times a Day.     • meclizine (ANTIVERT) 12.5 MG tablet Every 6 (Six) Hours As Needed.     • Multi Vitamin tablet tablet Daily.     • polyethylene glycol (MIRALAX) 17 GM/SCOOP powder Daily As Needed.     • sertraline (ZOLOFT) 25 MG tablet Daily.     • vitamin B-12 (CYANOCOBALAMIN) 1000 MCG tablet Take 1,000 mcg by mouth Daily.     • [DISCONTINUED] furosemide (LASIX) 40 MG tablet TAKE 1 TABLET BY MOUTH DAILY. 90 tablet 1   • [DISCONTINUED] lisinopril (PRINIVIL,ZESTRIL) 40 MG tablet TAKE 1 TABLET BY MOUTH DAILY. 90 tablet 1   • [DISCONTINUED] metoprolol tartrate (LOPRESSOR) 100 MG tablet Take 1 tablet by mouth 2 (Two) Times a Day. 180 tablet 1   • [DISCONTINUED] nitroglycerin (NITROLINGUAL) 0.4 MG/SPRAY spray Place 1 spray under the tongue Every 5 (Five) Minutes As Needed for Chest Pain. 100 each 3   • [DISCONTINUED] rosuvastatin (CRESTOR) 20 MG tablet TAKE 1 TABLET BY MOUTH EVERY NIGHT. 90 tablet 1     No current facility-administered medications on file prior to visit.       ALLERGIES  Codeine    HISTORY  Past Medical History:   Diagnosis Date   • Asthma    • CAD (coronary artery disease)    • Diabetes mellitus (HCC)    • Edema    • GERD (gastroesophageal reflux disease)    • HTN (hypertension)    • Hyperlipidemia    • Old MI (myocardial infarction)    • PILAR on CPAP    • Shortness of breath    • SSS (sick sinus syndrome) ([facility])        Social History     Socioeconomic History   • Marital status:    Tobacco Use   • Smoking status: Never   • Smokeless tobacco: Never   Substance and Sexual Activity   • Alcohol use: Never   • Drug use: Never   • Sexual activity: Defer       Family History   Problem Relation Age of Onset   • No Known Problems Mother    • Asthma Father    • No Known Problems Brother        Review of Systems   Constitutional:  "Positive for fatigue (All the time).   HENT: Negative for congestion, rhinorrhea and sore throat.    Eyes: Positive for visual disturbance (Glasses daily ).   Respiratory: Positive for shortness of breath. Negative for chest tightness.    Cardiovascular: Positive for leg swelling (BLE edema, goes down overnight ). Negative for chest pain and palpitations.   Gastrointestinal: Negative.    Genitourinary: Negative.    Musculoskeletal: Positive for gait problem (Ambulates w/ cane ).   Neurological: Positive for dizziness (Been a long term issue- takes Meclizine ). Negative for syncope, weakness, light-headedness, numbness and headaches.   Hematological: Bruises/bleeds easily (Bruises).   Psychiatric/Behavioral: Negative for sleep disturbance.       Objective     VITALS: /68   Pulse 71   Ht 162 cm (63.78\")   Wt 88.6 kg (195 lb 6.4 oz)   SpO2 91%   BMI 33.77 kg/m²     LABS:   Lab Results (most recent)     None          IMAGING:   No Images in the past 120 days found..    EXAM:  Physical Exam  Vitals reviewed.   Constitutional:       Appearance: She is well-developed.   HENT:      Head: Normocephalic and atraumatic.   Eyes:      Pupils: Pupils are equal, round, and reactive to light.   Neck:      Thyroid: No thyromegaly.      Vascular: No JVD.   Cardiovascular:      Rate and Rhythm: Normal rate and regular rhythm.      Heart sounds: Normal heart sounds. No murmur heard.    No friction rub. No gallop.   Pulmonary:      Effort: Pulmonary effort is normal. No respiratory distress.      Breath sounds: Normal breath sounds. No stridor. No wheezing or rales.   Chest:      Chest wall: No tenderness.   Musculoskeletal:         General: No tenderness or deformity.      Cervical back: Neck supple.   Skin:     General: Skin is warm and dry.   Neurological:      Mental Status: She is alert and oriented to person, place, and time.      Cranial Nerves: No cranial nerve deficit.      Coordination: Coordination normal. "         Procedure   Procedures       Assessment & Plan     Diagnoses and all orders for this visit:    1. Pre-operative cardiovascular examination (Primary)    2. Essential hypertension  -     metoprolol tartrate (LOPRESSOR) 100 MG tablet; Take 1 tablet by mouth 2 (Two) Times a Day.  Dispense: 180 tablet; Refill: 1  -     lisinopril (PRINIVIL,ZESTRIL) 40 MG tablet; Take 1 tablet by mouth Daily.  Dispense: 90 tablet; Refill: 1  -     furosemide (LASIX) 40 MG tablet; Take 1 tablet by mouth Daily.  Dispense: 90 tablet; Refill: 1    3. Coronary artery disease due to calcified coronary lesion  -     nitroglycerin (NITROLINGUAL) 0.4 MG/SPRAY spray; Place 1 spray under the tongue Every 5 (Five) Minutes As Needed for Chest Pain.  Dispense: 100 each; Refill: 3    4. Mixed hyperlipidemia  -     rosuvastatin (CRESTOR) 20 MG tablet; Take 1 tablet by mouth Every Night.  Dispense: 90 tablet; Refill: 1  -     Lipid Panel; Future  -     Comprehensive Metabolic Panel; Future    5. SSS (sick sinus syndrome) (HCC)    6. Shortness of breath    7. PILAR on CPAP    1.  She is a mild preoperative cardiac risk for cataract surgery  2.  Her blood pressure is well controlled continue current management  3.  No chest pain reported at all continue current management  4.  She said that her shortness of breath has improved since she has been using CPAP regularly  5.  No further syncope or significant dizziness her heart rate is in the 70s  6.  As mentioned above she will need CPAP every night  7.  I do not have any recent blood work I will try to get labs prior to her next visit      Return in about 6 months (around 8/15/2023).      Advance Care Planning   ACP discussion was declined by the patient. Patient does not have an advance directive, declines further assistance.             MEDS ORDERED DURING VISIT:  New Medications Ordered This Visit   Medications   • rosuvastatin (CRESTOR) 20 MG tablet     Sig: Take 1 tablet by mouth Every Night.      Dispense:  90 tablet     Refill:  1   • nitroglycerin (NITROLINGUAL) 0.4 MG/SPRAY spray     Sig: Place 1 spray under the tongue Every 5 (Five) Minutes As Needed for Chest Pain.     Dispense:  100 each     Refill:  3   • metoprolol tartrate (LOPRESSOR) 100 MG tablet     Sig: Take 1 tablet by mouth 2 (Two) Times a Day.     Dispense:  180 tablet     Refill:  1   • lisinopril (PRINIVIL,ZESTRIL) 40 MG tablet     Sig: Take 1 tablet by mouth Daily.     Dispense:  90 tablet     Refill:  1   • furosemide (LASIX) 40 MG tablet     Sig: Take 1 tablet by mouth Daily.     Dispense:  90 tablet     Refill:  1       As always, Yodit Ho MD  I appreciate very much the opportunity to participate in the cardiovascular care of your patients. Please do not hesitate to call me with any questions with regards to Susan Page evaluation and management.         This document has been electronically signed by Cassidy Villarreal MD  February 15, 2023 10:14 EST    This note is dictated utilizing voice recognition software.  Although this record has been proof read, transcriptional errors may still be present.

## 2023-02-28 ENCOUNTER — TELEPHONE (OUTPATIENT)
Dept: CARDIOLOGY | Facility: CLINIC | Age: 78
End: 2023-02-28
Payer: MEDICARE

## 2023-02-28 ENCOUNTER — PRIOR AUTHORIZATION (OUTPATIENT)
Dept: FAMILY MEDICINE CLINIC | Facility: CLINIC | Age: 78
End: 2023-02-28
Payer: MEDICARE

## 2023-02-28 RX ORDER — NITROGLYCERIN 0.3 MG/1
TABLET SUBLINGUAL
Qty: 25 TABLET | Refills: 11 | Status: SHIPPED | OUTPATIENT
Start: 2023-02-28

## 2023-02-28 NOTE — TELEPHONE ENCOUNTER
Prior authorization request received for Nitro spray. Authorization submitted through [employer]. Status pending.      PA Case: [id number], Status: Approved, Coverage Starts on: 1/1/2023 12:00:00 AM, Coverage Ends on: 12/31/2023

## 2023-08-01 ENCOUNTER — LAB (OUTPATIENT)
Dept: LAB | Facility: HOSPITAL | Age: 78
End: 2023-08-01
Payer: MEDICARE

## 2023-08-01 DIAGNOSIS — E78.2 MIXED HYPERLIPIDEMIA: ICD-10-CM

## 2023-08-01 LAB
ALBUMIN SERPL-MCNC: 4.4 G/DL (ref 3.5–5.2)
ALBUMIN/GLOB SERPL: 1.8 G/DL
ALP SERPL-CCNC: 101 U/L (ref 39–117)
ALT SERPL W P-5'-P-CCNC: 19 U/L (ref 1–33)
ANION GAP SERPL CALCULATED.3IONS-SCNC: 13.9 MMOL/L (ref 5–15)
AST SERPL-CCNC: 27 U/L (ref 1–32)
BILIRUB SERPL-MCNC: 0.4 MG/DL (ref 0–1.2)
BUN SERPL-MCNC: 55 MG/DL (ref 8–23)
BUN/CREAT SERPL: 35.7 (ref 7–25)
CALCIUM SPEC-SCNC: 9.7 MG/DL (ref 8.6–10.5)
CHLORIDE SERPL-SCNC: 101 MMOL/L (ref 98–107)
CHOLEST SERPL-MCNC: 121 MG/DL (ref 0–200)
CO2 SERPL-SCNC: 24.1 MMOL/L (ref 22–29)
CREAT SERPL-MCNC: 1.54 MG/DL (ref 0.57–1)
EGFRCR SERPLBLD CKD-EPI 2021: 34.8 ML/MIN/1.73
GLOBULIN UR ELPH-MCNC: 2.4 GM/DL
GLUCOSE SERPL-MCNC: 176 MG/DL (ref 65–99)
HDLC SERPL-MCNC: 34 MG/DL (ref 40–60)
LDLC SERPL CALC-MCNC: 46 MG/DL (ref 0–100)
LDLC/HDLC SERPL: 1.01 {RATIO}
POTASSIUM SERPL-SCNC: 4.8 MMOL/L (ref 3.5–5.2)
PROT SERPL-MCNC: 6.8 G/DL (ref 6–8.5)
SODIUM SERPL-SCNC: 139 MMOL/L (ref 136–145)
TRIGL SERPL-MCNC: 264 MG/DL (ref 0–150)
VLDLC SERPL-MCNC: 41 MG/DL (ref 5–40)

## 2023-08-01 PROCEDURE — 80053 COMPREHEN METABOLIC PANEL: CPT | Performed by: SPECIALIST

## 2023-08-01 PROCEDURE — 80061 LIPID PANEL: CPT | Performed by: SPECIALIST

## 2023-08-16 ENCOUNTER — OFFICE VISIT (OUTPATIENT)
Dept: CARDIOLOGY | Facility: CLINIC | Age: 78
End: 2023-08-16
Payer: MEDICARE

## 2023-08-16 VITALS
HEART RATE: 80 BPM | WEIGHT: 197.4 LBS | OXYGEN SATURATION: 92 % | BODY MASS INDEX: 33.7 KG/M2 | HEIGHT: 64 IN | SYSTOLIC BLOOD PRESSURE: 103 MMHG | DIASTOLIC BLOOD PRESSURE: 58 MMHG

## 2023-08-16 DIAGNOSIS — I25.10 CORONARY ARTERY DISEASE DUE TO CALCIFIED CORONARY LESION: ICD-10-CM

## 2023-08-16 DIAGNOSIS — I10 ESSENTIAL HYPERTENSION: Primary | ICD-10-CM

## 2023-08-16 DIAGNOSIS — G47.33 OSA ON CPAP: ICD-10-CM

## 2023-08-16 DIAGNOSIS — E78.2 MIXED HYPERLIPIDEMIA: ICD-10-CM

## 2023-08-16 DIAGNOSIS — I25.2 OLD MI (MYOCARDIAL INFARCTION): ICD-10-CM

## 2023-08-16 DIAGNOSIS — I49.5 SSS (SICK SINUS SYNDROME): ICD-10-CM

## 2023-08-16 DIAGNOSIS — R60.0 EDEMA LEG: ICD-10-CM

## 2023-08-16 DIAGNOSIS — Z99.89 OSA ON CPAP: ICD-10-CM

## 2023-08-16 DIAGNOSIS — I25.84 CORONARY ARTERY DISEASE DUE TO CALCIFIED CORONARY LESION: ICD-10-CM

## 2023-08-16 DIAGNOSIS — R06.02 SHORTNESS OF BREATH: ICD-10-CM

## 2023-08-16 PROCEDURE — 3074F SYST BP LT 130 MM HG: CPT | Performed by: SPECIALIST

## 2023-08-16 PROCEDURE — 99214 OFFICE O/P EST MOD 30 MIN: CPT | Performed by: SPECIALIST

## 2023-08-16 PROCEDURE — 3078F DIAST BP <80 MM HG: CPT | Performed by: SPECIALIST

## 2023-08-16 RX ORDER — ROSUVASTATIN CALCIUM 20 MG/1
20 TABLET, COATED ORAL NIGHTLY
Qty: 90 TABLET | Refills: 1 | Status: SHIPPED | OUTPATIENT
Start: 2023-08-16

## 2023-08-16 RX ORDER — LISINOPRIL 40 MG/1
40 TABLET ORAL DAILY
Qty: 90 TABLET | Refills: 1 | Status: SHIPPED | OUTPATIENT
Start: 2023-08-16

## 2023-08-16 RX ORDER — FUROSEMIDE 40 MG/1
40 TABLET ORAL DAILY
Qty: 90 TABLET | Refills: 1 | Status: SHIPPED | OUTPATIENT
Start: 2023-08-16

## 2023-08-16 RX ORDER — ALBUTEROL SULFATE 90 UG/1
AEROSOL, METERED RESPIRATORY (INHALATION)
COMMUNITY
Start: 2023-08-15

## 2023-08-16 RX ORDER — METOPROLOL TARTRATE 100 MG/1
100 TABLET ORAL 2 TIMES DAILY
Qty: 180 TABLET | Refills: 1 | Status: SHIPPED | OUTPATIENT
Start: 2023-08-16

## 2023-08-16 RX ORDER — NITROGLYCERIN 0.3 MG/1
TABLET SUBLINGUAL
Qty: 25 TABLET | Refills: 11 | Status: SHIPPED | OUTPATIENT
Start: 2023-08-16

## 2023-08-16 NOTE — PROGRESS NOTES
"Subjective:       Patient ID: Jerrod Ramirez is a 34 y.o. male.    Vitals:  height is 6' 4" (1.93 m) and weight is 83.9 kg (185 lb). His oral temperature is 97.3 °F (36.3 °C). His blood pressure is 118/77 and his pulse is 78. His respiration is 19 and oxygen saturation is 98%.     Chief Complaint: Sinus Problem    Patient with sinus pressure x 1 day. Patient stating the pressure started last night. He reports a headache and chills. He had night sweats last followed by a headache. Patient with nasal congestion. No cough.       Sinus Problem   This is a new problem. The current episode started yesterday. The problem is unchanged. There has been no fever. His pain is at a severity of 7/10. Associated symptoms include chills, congestion and headaches. Pertinent negatives include no coughing, ear pain, hoarse voice, shortness of breath or sore throat. Treatments tried: Mucinex. The treatment provided no relief.     Review of Systems   Constitution: Positive for chills and night sweats. Negative for fever and malaise/fatigue.   HENT: Positive for congestion. Negative for ear pain, hoarse voice and sore throat.         Sinus pressure   Eyes: Negative for discharge and redness.   Cardiovascular: Negative for chest pain, dyspnea on exertion and leg swelling.   Respiratory: Negative for cough, shortness of breath, sputum production and wheezing.    Musculoskeletal: Negative for myalgias.   Gastrointestinal: Negative for abdominal pain and nausea.   Neurological: Positive for headaches.       Objective:      Physical Exam   Constitutional: He appears well-developed and well-nourished.   HENT:   Head: Normocephalic and atraumatic.   Nose: Mucosal edema and rhinorrhea (purulent) present. Right sinus exhibits maxillary sinus tenderness. Left sinus exhibits maxillary sinus tenderness.   Eyes: EOM are normal. Pupils are equal, round, and reactive to light.   Neck: Normal range of motion. Neck supple.   Cardiovascular: Normal rate, " Subjective   Follow up, HTN  Susan Page is a 78 y.o. female who presents to day for Follow-up (Here for 6 mo. F/u), Coronary Artery Disease, Hyperlipidemia, Hypertension, Sleep Apnea, and Shortness of Breath.    CHIEF COMPLIANT  Chief Complaint   Patient presents with    Follow-up     Here for 6 mo. F/u    Coronary Artery Disease    Hyperlipidemia    Hypertension    Sleep Apnea    Shortness of Breath     Problem list:     1. CAD  1.1 [facility], 9-, EF 50-55%, LAD LIs, nl. RCA, NL. LCx  2. SSS  2. HX MI in past  4. HTN  5. Hyperlipidemia  6. PILAR, uses C-PAP, followed by Dr. Collins.  7. Asthma  8. GERD  9. NIDDM    Problem List Items Addressed This Visit          Cardiac and Vasculature    Coronary artery disease due to calcified coronary lesion    Relevant Medications    metoprolol tartrate (LOPRESSOR) 100 MG tablet    nitroglycerin (NITROSTAT) 0.3 MG SL tablet    Old MI (myocardial infarction)    Relevant Medications    metoprolol tartrate (LOPRESSOR) 100 MG tablet    nitroglycerin (NITROSTAT) 0.3 MG SL tablet    SSS (sick sinus syndrome)    Relevant Medications    metoprolol tartrate (LOPRESSOR) 100 MG tablet    nitroglycerin (NITROSTAT) 0.3 MG SL tablet    Essential hypertension - Primary    Relevant Medications    furosemide (LASIX) 40 MG tablet    lisinopril (PRINIVIL,ZESTRIL) 40 MG tablet    metoprolol tartrate (LOPRESSOR) 100 MG tablet    Mixed hyperlipidemia    Relevant Medications    rosuvastatin (CRESTOR) 20 MG tablet    Other Relevant Orders    Lipid Panel    Comprehensive Metabolic Panel       Pulmonary and Pneumonias    Shortness of breath       Sleep    PILAR on CPAP       Symptoms and Signs    Edema leg       HPI    She has cataract surgery she is doing much better now also she had no hearing aid and cardiac wise she is doing fine no chest pain she has stable shortness of breath on moderate exertion with stable lower extremity edema no recent palpitations she occasionally and rarely gets dizzy but no  regular rhythm and normal heart sounds.    Pulmonary/Chest: Effort normal and breath sounds normal.   Abdominal: Soft.   Nursing note and vitals reviewed.      Assessment:       1. Acute maxillary sinusitis, recurrence not specified        Plan:         Acute maxillary sinusitis, recurrence not specified  -     betamethasone acetate-betamethasone sodium phosphate injection 6 mg; Inject 1 mL (6 mg total) into the muscle one time.  -     amoxicillin-clavulanate 875-125mg (AUGMENTIN) 875-125 mg per tablet; Take 1 tablet by mouth 2 (two) times daily.  Dispense: 20 tablet; Refill: 0      Recommend Muccinex OTC    syncope she has been using the CPAP every night                  PRIOR MEDS  Current Outpatient Medications on File Prior to Visit   Medication Sig Dispense Refill    acetaminophen (TYLENOL) 325 MG tablet Take 2 tablets by mouth Every 6 (Six) Hours As Needed for Mild Pain.      allopurinol (ZYLOPRIM) 300 MG tablet Take 1 tablet by mouth Daily.      aspirin (aspirin) 81 MG EC tablet Take 1 tablet by mouth Daily. 90 tablet 1    cetirizine (zyrTEC) 10 MG tablet Take 1 tablet by mouth Daily.      dicyclomine (BENTYL) 20 MG tablet Take 1 tablet by mouth Daily.      Lantus SoloStar 100 UNIT/ML injection pen 32 Units 2 (Two) Times a Day.      meclizine (ANTIVERT) 12.5 MG tablet Every 6 (Six) Hours As Needed.      Multi Vitamin tablet tablet Daily.      polyethylene glycol (MIRALAX) 17 GM/SCOOP powder Daily As Needed.      sertraline (ZOLOFT) 25 MG tablet Daily.      Ventolin  (90 Base) MCG/ACT inhaler prn      vitamin B-12 (CYANOCOBALAMIN) 1000 MCG tablet Take 1 tablet by mouth Daily.      [DISCONTINUED] furosemide (LASIX) 40 MG tablet Take 1 tablet by mouth Daily. 90 tablet 1    [DISCONTINUED] lisinopril (PRINIVIL,ZESTRIL) 40 MG tablet Take 1 tablet by mouth Daily. 90 tablet 1    [DISCONTINUED] metoprolol tartrate (LOPRESSOR) 100 MG tablet Take 1 tablet by mouth 2 (Two) Times a Day. 180 tablet 1    [DISCONTINUED] rosuvastatin (CRESTOR) 20 MG tablet Take 1 tablet by mouth Every Night. 90 tablet 1    [DISCONTINUED] nitroglycerin (NITROSTAT) 0.3 MG SL tablet 1 under the tongue as needed for angina, may repeat q5mins for up three doses (Patient not taking: Reported on 8/16/2023) 25 tablet 11     No current facility-administered medications on file prior to visit.       ALLERGIES  Codeine    HISTORY  Past Medical History:   Diagnosis Date    Asthma     CAD (coronary artery disease)     Diabetes mellitus     Edema     GERD (gastroesophageal reflux disease)     HTN (hypertension)     Hyperlipidemia     Old MI (myocardial  "infarction)     PILAR on CPAP     Shortness of breath     SSS (sick sinus syndrome)        Social History     Socioeconomic History    Marital status:    Tobacco Use    Smoking status: Never    Smokeless tobacco: Never   Substance and Sexual Activity    Alcohol use: Never    Drug use: Never    Sexual activity: Defer       Family History   Problem Relation Age of Onset    No Known Problems Mother     Asthma Father     No Known Problems Brother        Review of Systems   Constitutional:  Positive for fatigue (off and on).   HENT: Negative.     Eyes:  Positive for visual disturbance (glasses).   Respiratory:  Positive for shortness of breath.    Cardiovascular:  Positive for leg swelling. Negative for chest pain and palpitations.   Gastrointestinal:  Positive for diarrhea (last 3-4 days).   Endocrine: Negative.    Genitourinary: Negative.    Musculoskeletal:  Positive for arthralgias, gait problem (ambulates with cane) and myalgias.   Skin: Negative.    Allergic/Immunologic: Negative.    Neurological:  Positive for dizziness and light-headedness. Negative for syncope.   Hematological:  Bruises/bleeds easily.   Psychiatric/Behavioral:  Positive for sleep disturbance (off and on).      Objective     VITALS: /58 (BP Location: Left arm, Patient Position: Sitting)   Pulse 80   Ht 162 cm (63.78\")   Wt 89.5 kg (197 lb 6.4 oz)   SpO2 92%   BMI 34.12 kg/mý     LABS:   Lab Results (most recent)       None            IMAGING:   No Images in the past 120 days found..    EXAM:  Physical Exam  Vitals reviewed.   Constitutional:       Appearance: She is well-developed.   HENT:      Head: Normocephalic and atraumatic.   Eyes:      Pupils: Pupils are equal, round, and reactive to light.   Neck:      Thyroid: No thyromegaly.      Vascular: No JVD.   Cardiovascular:      Rate and Rhythm: Normal rate and regular rhythm.      Heart sounds: Normal heart sounds. No murmur heard.    No friction rub. No gallop.      Comments: " Trace edema  Pulmonary:      Effort: Pulmonary effort is normal. No respiratory distress.      Breath sounds: Normal breath sounds. No stridor. No wheezing or rales.   Chest:      Chest wall: No tenderness.   Musculoskeletal:         General: No tenderness or deformity.      Cervical back: Neck supple.   Skin:     General: Skin is warm and dry.   Neurological:      Mental Status: She is alert and oriented to person, place, and time.      Cranial Nerves: No cranial nerve deficit.      Coordination: Coordination normal.       Procedure   Procedures       Assessment & Plan     Diagnoses and all orders for this visit:    1. Essential hypertension (Primary)  -     furosemide (LASIX) 40 MG tablet; Take 1 tablet by mouth Daily.  Dispense: 90 tablet; Refill: 1  -     lisinopril (PRINIVIL,ZESTRIL) 40 MG tablet; Take 1 tablet by mouth Daily.  Dispense: 90 tablet; Refill: 1  -     metoprolol tartrate (LOPRESSOR) 100 MG tablet; Take 1 tablet by mouth 2 (Two) Times a Day.  Dispense: 180 tablet; Refill: 1    2. Coronary artery disease due to calcified coronary lesion  -     nitroglycerin (NITROSTAT) 0.3 MG SL tablet; 1 under the tongue as needed for angina, may repeat q5mins for up three doses  Dispense: 25 tablet; Refill: 11    3. Mixed hyperlipidemia  -     rosuvastatin (CRESTOR) 20 MG tablet; Take 1 tablet by mouth Every Night.  Dispense: 90 tablet; Refill: 1  -     Lipid Panel; Future  -     Comprehensive Metabolic Panel; Future    4. Old MI (myocardial infarction)    5. SSS (sick sinus syndrome)    6. Shortness of breath    7. PILAR on CPAP    8. Edema leg    1.  Her blood pressure is well controlled we will continue current management  2.  No recent chest pain continue current management  3.  Blood pressure is trolled together with heart rate so will not change any of the medications right now  4.  She has been using CPAP every night  5.  We discussed the blood work and that showed elevated triglycerides and fasting glucose  as well as a creatinine advised her to talk to Dr. Ho her primary physician about this sugar and this also affects her level of triglycerides I advised her to cut down the carbohydrate intake    Return in about 6 months (around 2024).      Advance Care Planning   ACP discussion was held with the patient during this visit. Patient does not have an advance directive, declines further assistance.             MEDS ORDERED DURING VISIT:  New Medications Ordered This Visit   Medications    furosemide (LASIX) 40 MG tablet     Sig: Take 1 tablet by mouth Daily.     Dispense:  90 tablet     Refill:  1    lisinopril (PRINIVIL,ZESTRIL) 40 MG tablet     Sig: Take 1 tablet by mouth Daily.     Dispense:  90 tablet     Refill:  1    metoprolol tartrate (LOPRESSOR) 100 MG tablet     Sig: Take 1 tablet by mouth 2 (Two) Times a Day.     Dispense:  180 tablet     Refill:  1    nitroglycerin (NITROSTAT) 0.3 MG SL tablet     Si under the tongue as needed for angina, may repeat q5mins for up three doses     Dispense:  25 tablet     Refill:  11    rosuvastatin (CRESTOR) 20 MG tablet     Sig: Take 1 tablet by mouth Every Night.     Dispense:  90 tablet     Refill:  1       As always, Yodit Ho MD  I appreciate very much the opportunity to participate in the cardiovascular care of your patients. Please do not hesitate to call me with any questions with regards to Susan Page evaluation and management.         This document has been electronically signed by Cassidy Villarreal MD  2023 14:08 EDT    This note is dictated utilizing voice recognition software.

## 2024-03-07 ENCOUNTER — LAB (OUTPATIENT)
Dept: LAB | Facility: HOSPITAL | Age: 79
End: 2024-03-07
Payer: MEDICARE

## 2024-03-07 DIAGNOSIS — E78.2 MIXED HYPERLIPIDEMIA: ICD-10-CM

## 2024-03-07 LAB
ALBUMIN SERPL-MCNC: 4 G/DL (ref 3.5–5.2)
ALBUMIN/GLOB SERPL: 1.4 G/DL
ALP SERPL-CCNC: 93 U/L (ref 39–117)
ALT SERPL W P-5'-P-CCNC: 12 U/L (ref 1–33)
ANION GAP SERPL CALCULATED.3IONS-SCNC: 14.5 MMOL/L (ref 5–15)
AST SERPL-CCNC: 21 U/L (ref 1–32)
BILIRUB SERPL-MCNC: 0.4 MG/DL (ref 0–1.2)
BUN SERPL-MCNC: 51 MG/DL (ref 8–23)
BUN/CREAT SERPL: 44.7 (ref 7–25)
CALCIUM SPEC-SCNC: 9.4 MG/DL (ref 8.6–10.5)
CHLORIDE SERPL-SCNC: 104 MMOL/L (ref 98–107)
CHOLEST SERPL-MCNC: 125 MG/DL (ref 0–200)
CO2 SERPL-SCNC: 23.5 MMOL/L (ref 22–29)
CREAT SERPL-MCNC: 1.14 MG/DL (ref 0.57–1)
EGFRCR SERPLBLD CKD-EPI 2021: 50 ML/MIN/1.73
GLOBULIN UR ELPH-MCNC: 2.9 GM/DL
GLUCOSE SERPL-MCNC: 124 MG/DL (ref 65–99)
HDLC SERPL-MCNC: 30 MG/DL (ref 40–60)
LDLC SERPL CALC-MCNC: 60 MG/DL (ref 0–100)
LDLC/HDLC SERPL: 1.75 {RATIO}
POTASSIUM SERPL-SCNC: 4.4 MMOL/L (ref 3.5–5.2)
PROT SERPL-MCNC: 6.9 G/DL (ref 6–8.5)
SODIUM SERPL-SCNC: 142 MMOL/L (ref 136–145)
TRIGL SERPL-MCNC: 213 MG/DL (ref 0–150)
VLDLC SERPL-MCNC: 35 MG/DL (ref 5–40)

## 2024-03-07 PROCEDURE — 80053 COMPREHEN METABOLIC PANEL: CPT

## 2024-03-07 PROCEDURE — 36415 COLL VENOUS BLD VENIPUNCTURE: CPT

## 2024-03-07 PROCEDURE — 80061 LIPID PANEL: CPT

## 2024-03-22 DIAGNOSIS — I10 ESSENTIAL HYPERTENSION: ICD-10-CM

## 2024-03-22 RX ORDER — FUROSEMIDE 40 MG/1
40 TABLET ORAL DAILY
Qty: 90 TABLET | Refills: 1 | Status: SHIPPED | OUTPATIENT
Start: 2024-03-22

## 2024-03-25 DIAGNOSIS — I10 ESSENTIAL HYPERTENSION: ICD-10-CM

## 2024-03-25 RX ORDER — LISINOPRIL 40 MG/1
40 TABLET ORAL DAILY
Qty: 90 TABLET | Refills: 1 | Status: SHIPPED | OUTPATIENT
Start: 2024-03-25

## 2024-04-03 ENCOUNTER — OFFICE VISIT (OUTPATIENT)
Dept: CARDIOLOGY | Facility: CLINIC | Age: 79
End: 2024-04-03
Payer: MEDICARE

## 2024-04-03 VITALS
WEIGHT: 199.8 LBS | SYSTOLIC BLOOD PRESSURE: 126 MMHG | DIASTOLIC BLOOD PRESSURE: 70 MMHG | HEIGHT: 64 IN | OXYGEN SATURATION: 96 % | HEART RATE: 71 BPM | BODY MASS INDEX: 34.11 KG/M2

## 2024-04-03 DIAGNOSIS — G47.33 OSA ON CPAP: ICD-10-CM

## 2024-04-03 DIAGNOSIS — E78.2 MIXED HYPERLIPIDEMIA: ICD-10-CM

## 2024-04-03 DIAGNOSIS — I25.84 CORONARY ARTERY DISEASE DUE TO CALCIFIED CORONARY LESION: ICD-10-CM

## 2024-04-03 DIAGNOSIS — I10 ESSENTIAL HYPERTENSION: Primary | ICD-10-CM

## 2024-04-03 DIAGNOSIS — I25.10 CORONARY ARTERY DISEASE DUE TO CALCIFIED CORONARY LESION: ICD-10-CM

## 2024-04-03 DIAGNOSIS — R06.02 SHORTNESS OF BREATH: ICD-10-CM

## 2024-04-03 DIAGNOSIS — I49.5 SSS (SICK SINUS SYNDROME): ICD-10-CM

## 2024-04-03 DIAGNOSIS — E11.9 TYPE 2 DIABETES MELLITUS WITHOUT COMPLICATION, WITHOUT LONG-TERM CURRENT USE OF INSULIN: ICD-10-CM

## 2024-04-03 RX ORDER — METOPROLOL TARTRATE 100 MG/1
100 TABLET ORAL 2 TIMES DAILY
Qty: 180 TABLET | Refills: 1 | Status: SHIPPED | OUTPATIENT
Start: 2024-04-03

## 2024-04-03 RX ORDER — FUROSEMIDE 40 MG/1
40 TABLET ORAL DAILY
Qty: 90 TABLET | Refills: 1 | Status: SHIPPED | OUTPATIENT
Start: 2024-04-03

## 2024-04-03 RX ORDER — HYDROCODONE BITARTRATE AND ACETAMINOPHEN 5; 325 MG/1; MG/1
1 TABLET ORAL NIGHTLY PRN
COMMUNITY

## 2024-04-03 RX ORDER — NITROGLYCERIN 0.3 MG/1
TABLET SUBLINGUAL
Qty: 25 TABLET | Refills: 11 | Status: SHIPPED | OUTPATIENT
Start: 2024-04-03

## 2024-04-03 RX ORDER — NAPROXEN 500 MG/1
500 TABLET ORAL 2 TIMES DAILY PRN
COMMUNITY

## 2024-04-03 RX ORDER — ROSUVASTATIN CALCIUM 20 MG/1
20 TABLET, COATED ORAL NIGHTLY
Qty: 90 TABLET | Refills: 1 | Status: SHIPPED | OUTPATIENT
Start: 2024-04-03

## 2024-04-03 RX ORDER — LISINOPRIL 40 MG/1
40 TABLET ORAL DAILY
Qty: 90 TABLET | Refills: 1 | Status: SHIPPED | OUTPATIENT
Start: 2024-04-03

## 2024-04-03 NOTE — PROGRESS NOTES
Subjective   Follow up, HTN  Susan Page is a 78 y.o. female who presents to day for Follow-up (6 month follow up / lab result's is in chart patient had them done here.).    CHIEF COMPLIANT  Chief Complaint   Patient presents with    Follow-up     6 month follow up / lab result's is in chart patient had them done here.       Active Problems:  Problem List Items Addressed This Visit          Cardiac and Vasculature    Coronary artery disease due to calcified coronary lesion    Relevant Medications    metoprolol tartrate (LOPRESSOR) 100 MG tablet    nitroglycerin (NITROSTAT) 0.3 MG SL tablet    SSS (sick sinus syndrome)    Relevant Medications    metoprolol tartrate (LOPRESSOR) 100 MG tablet    nitroglycerin (NITROSTAT) 0.3 MG SL tablet    Essential hypertension - Primary    Relevant Medications    furosemide (LASIX) 40 MG tablet    lisinopril (PRINIVIL,ZESTRIL) 40 MG tablet    metoprolol tartrate (LOPRESSOR) 100 MG tablet    Mixed hyperlipidemia    Relevant Medications    rosuvastatin (CRESTOR) 20 MG tablet    Other Relevant Orders    Lipid Panel    Comprehensive Metabolic Panel       Endocrine and Metabolic    Diabetes mellitus    Relevant Orders    Hemoglobin A1c       Pulmonary and Pneumonias    Shortness of breath       Sleep    PILAR on CPAP       HPI  HPI  She has been doing fine blood pressure well-controlled at home she said that she has been using the CPAP every night no recent chest pain she does have NYHA class II dyspnea on exertion which is stable and chronic lower extremity edema  PRIOR MEDS  Current Outpatient Medications on File Prior to Visit   Medication Sig Dispense Refill    acetaminophen (TYLENOL) 325 MG tablet Take 2 tablets by mouth Every 6 (Six) Hours As Needed for Mild Pain.      allopurinol (ZYLOPRIM) 300 MG tablet Take 1 tablet by mouth Daily.      aspirin (aspirin) 81 MG EC tablet Take 1 tablet by mouth Daily. 90 tablet 1    cetirizine (zyrTEC) 10 MG tablet Take 1 tablet by mouth Daily.       dicyclomine (BENTYL) 20 MG tablet Take 1 tablet by mouth Daily.      HYDROcodone-acetaminophen (NORCO) 5-325 MG per tablet Take 1 tablet by mouth At Night As Needed.      Lantus SoloStar 100 UNIT/ML injection pen 32 Units 2 (Two) Times a Day.      meclizine (ANTIVERT) 12.5 MG tablet Every 6 (Six) Hours As Needed.      Multi Vitamin tablet tablet Daily.      naproxen (NAPROSYN) 500 MG tablet Take 1 tablet by mouth 2 (Two) Times a Day As Needed.      sertraline (ZOLOFT) 25 MG tablet Daily.      Ventolin  (90 Base) MCG/ACT inhaler prn      vitamin B-12 (CYANOCOBALAMIN) 1000 MCG tablet Take 1 tablet by mouth Daily.      [DISCONTINUED] furosemide (LASIX) 40 MG tablet TAKE 1 TABLET BY MOUTH DAILY. 90 tablet 1    [DISCONTINUED] lisinopril (PRINIVIL,ZESTRIL) 40 MG tablet TAKE 1 TABLET BY MOUTH DAILY. 90 tablet 1    [DISCONTINUED] metoprolol tartrate (LOPRESSOR) 100 MG tablet Take 1 tablet by mouth 2 (Two) Times a Day. 180 tablet 1    [DISCONTINUED] nitroglycerin (NITROSTAT) 0.3 MG SL tablet 1 under the tongue as needed for angina, may repeat q5mins for up three doses 25 tablet 11    [DISCONTINUED] rosuvastatin (CRESTOR) 20 MG tablet Take 1 tablet by mouth Every Night. 90 tablet 1    [DISCONTINUED] polyethylene glycol (MIRALAX) 17 GM/SCOOP powder Daily As Needed.       No current facility-administered medications on file prior to visit.       ALLERGIES  Fish oil and Codeine    HISTORY  Past Medical History:   Diagnosis Date    Asthma     CAD (coronary artery disease)     Diabetes mellitus     Edema     GERD (gastroesophageal reflux disease)     HTN (hypertension)     Hyperlipidemia     Old MI (myocardial infarction)     PILAR on CPAP     Shortness of breath     SSS (sick sinus syndrome)        Social History     Socioeconomic History    Marital status:    Tobacco Use    Smoking status: Never    Smokeless tobacco: Never   Substance and Sexual Activity    Alcohol use: Never    Drug use: Never    Sexual activity:  "Defer       Family History   Problem Relation Age of Onset    No Known Problems Mother     Asthma Father     No Known Problems Brother        Review of Systems   Constitutional: Negative.  Negative for chills, diaphoresis, fatigue and fever.   HENT: Negative.     Eyes: Negative.  Negative for visual disturbance.   Respiratory:  Positive for apnea and cough. Negative for chest tightness, shortness of breath and wheezing.    Cardiovascular:  Negative for chest pain, palpitations and leg swelling.   Gastrointestinal: Negative.  Negative for abdominal pain and blood in stool.   Endocrine: Negative.    Genitourinary: Negative.  Negative for hematuria.   Musculoskeletal:  Positive for arthralgias, back pain, myalgias, neck pain and neck stiffness.   Skin: Negative.  Negative for rash and wound.   Allergic/Immunologic: Positive for environmental allergies (seasonal). Negative for food allergies.   Neurological:  Positive for dizziness (vertigo). Negative for syncope, weakness, light-headedness, numbness and headaches.   Hematological:  Bruises/bleeds easily (bruises easily, is on aspirin).   Psychiatric/Behavioral:  Positive for sleep disturbance (sleep apnea).        Objective     VITALS: /70   Pulse 71   Ht 162.6 cm (64\")   Wt 90.6 kg (199 lb 12.8 oz)   SpO2 96%   BMI 34.30 kg/m²     LABS:   Lab Results (most recent)       None            IMAGING:   No Images in the past 120 days found..    EXAM:  Physical Exam  Vitals reviewed.   Constitutional:       Appearance: She is well-developed.   HENT:      Head: Normocephalic and atraumatic.   Eyes:      Pupils: Pupils are equal, round, and reactive to light.   Neck:      Thyroid: No thyromegaly.      Vascular: No JVD.   Cardiovascular:      Rate and Rhythm: Normal rate and regular rhythm.      Heart sounds: Normal heart sounds. No murmur heard.     No friction rub. No gallop.      Comments: Trace LE edema  Pulmonary:      Effort: Pulmonary effort is normal. No " respiratory distress.      Breath sounds: Normal breath sounds. No stridor. No wheezing or rales.   Chest:      Chest wall: No tenderness.   Musculoskeletal:         General: No tenderness or deformity.      Cervical back: Neck supple.   Skin:     General: Skin is warm and dry.   Neurological:      Mental Status: She is alert and oriented to person, place, and time.      Cranial Nerves: No cranial nerve deficit.      Coordination: Coordination normal.         Procedure   Procedures        Assessment & Plan     Diagnoses and all orders for this visit:    1. Essential hypertension (Primary)  -     furosemide (LASIX) 40 MG tablet; Take 1 tablet by mouth Daily.  Dispense: 90 tablet; Refill: 1  -     lisinopril (PRINIVIL,ZESTRIL) 40 MG tablet; Take 1 tablet by mouth Daily.  Dispense: 90 tablet; Refill: 1  -     metoprolol tartrate (LOPRESSOR) 100 MG tablet; Take 1 tablet by mouth 2 (Two) Times a Day.  Dispense: 180 tablet; Refill: 1    2. Coronary artery disease due to calcified coronary lesion  -     nitroglycerin (NITROSTAT) 0.3 MG SL tablet; 1 under the tongue as needed for angina, may repeat q5mins for up three doses  Dispense: 25 tablet; Refill: 11    3. Mixed hyperlipidemia  -     rosuvastatin (CRESTOR) 20 MG tablet; Take 1 tablet by mouth Every Night.  Dispense: 90 tablet; Refill: 1  -     Lipid Panel; Future  -     Comprehensive Metabolic Panel; Future    4. SSS (sick sinus syndrome)    5. Type 2 diabetes mellitus without complication, without long-term current use of insulin  -     Hemoglobin A1c; Future    6. Shortness of breath    7. PILAR on CPAP    1.  Her blood pressure is well-controlled we will continue the current management  2.  No recent chest pain or told we will continue current management  3.  We reviewed the labs with elevated fasting glucose of 124 but with improved creatinine from 1.54-1.14, rest of CMP is unremarkable, lipid profile showed improved but still elevated triglycerides down from 2   but worsening HDL level down from 34-30 her LDL is 60 we will continue with the statin advised her to cut down the carbohydrate intake  4.  She has been using CPAP every night  5.  Stable dyspnea on exertion still having intermittent edema advised her to cut down the salt intake    Return in about 6 months (around 10/3/2024).      Advance Care Planning   ACP discussion was held with the patient during this visit. Patient does not have an advance directive, declines further assistance.             MEDS ORDERED DURING VISIT:  New Medications Ordered This Visit   Medications    furosemide (LASIX) 40 MG tablet     Sig: Take 1 tablet by mouth Daily.     Dispense:  90 tablet     Refill:  1    lisinopril (PRINIVIL,ZESTRIL) 40 MG tablet     Sig: Take 1 tablet by mouth Daily.     Dispense:  90 tablet     Refill:  1    metoprolol tartrate (LOPRESSOR) 100 MG tablet     Sig: Take 1 tablet by mouth 2 (Two) Times a Day.     Dispense:  180 tablet     Refill:  1    nitroglycerin (NITROSTAT) 0.3 MG SL tablet     Si under the tongue as needed for angina, may repeat q5mins for up three doses     Dispense:  25 tablet     Refill:  11    rosuvastatin (CRESTOR) 20 MG tablet     Sig: Take 1 tablet by mouth Every Night.     Dispense:  90 tablet     Refill:  1       As always, Yodit Ho MD  I appreciate very much the opportunity to participate in the cardiovascular care of your patients. Please do not hesitate to call me with any questions with regards to Susan Page evaluation and management.         This document has been electronically signed by Cassidy Villarreal MD  April 3, 2024 14:25 EDT    This note is dictated utilizing voice recognition software.

## 2024-09-30 DIAGNOSIS — E78.2 MIXED HYPERLIPIDEMIA: ICD-10-CM

## 2024-09-30 RX ORDER — ROSUVASTATIN CALCIUM 20 MG/1
20 TABLET, COATED ORAL NIGHTLY
Qty: 90 TABLET | Refills: 1 | Status: SHIPPED | OUTPATIENT
Start: 2024-09-30

## 2024-10-14 ENCOUNTER — TELEPHONE (OUTPATIENT)
Dept: CARDIOLOGY | Facility: CLINIC | Age: 79
End: 2024-10-14

## 2024-10-14 NOTE — TELEPHONE ENCOUNTER
Caller: JUNG PATINO    Relationship: Other    Best call back number: [phone]    What is the best time to reach you: ANYTIME    Who are you requesting to speak with (clinical staff, provider,  specific staff member): CLINICAL    Do you know the name of the person who called: NOT SURE    What was the call regarding: PATIENTS DAUGHTER IN LAW JUNG PATINO IS RETURNING A MISSED CALL. IF DR. TA'S OFFICE IS TRYING TO REACH PATIENT PLEASE CALL BACK.    Is it okay if the provider responds through MyChart: CALL

## 2024-10-16 ENCOUNTER — OFFICE VISIT (OUTPATIENT)
Dept: CARDIOLOGY | Facility: CLINIC | Age: 79
End: 2024-10-16
Payer: MEDICARE

## 2024-10-16 VITALS
OXYGEN SATURATION: 94 % | WEIGHT: 185 LBS | SYSTOLIC BLOOD PRESSURE: 103 MMHG | HEART RATE: 65 BPM | HEIGHT: 64 IN | BODY MASS INDEX: 31.58 KG/M2 | DIASTOLIC BLOOD PRESSURE: 61 MMHG

## 2024-10-16 DIAGNOSIS — E78.2 MIXED HYPERLIPIDEMIA: ICD-10-CM

## 2024-10-16 DIAGNOSIS — I49.5 SSS (SICK SINUS SYNDROME): ICD-10-CM

## 2024-10-16 DIAGNOSIS — R06.02 SHORTNESS OF BREATH: ICD-10-CM

## 2024-10-16 DIAGNOSIS — E11.9 TYPE 2 DIABETES MELLITUS WITHOUT COMPLICATION, WITHOUT LONG-TERM CURRENT USE OF INSULIN: ICD-10-CM

## 2024-10-16 DIAGNOSIS — R55 SYNCOPE AND COLLAPSE: Primary | ICD-10-CM

## 2024-10-16 DIAGNOSIS — I10 ESSENTIAL HYPERTENSION: ICD-10-CM

## 2024-10-16 RX ORDER — LISINOPRIL 20 MG/1
20 TABLET ORAL DAILY
Qty: 90 TABLET | Refills: 1 | Status: SHIPPED | OUTPATIENT
Start: 2024-10-16

## 2024-10-16 RX ORDER — FUROSEMIDE 40 MG
40 TABLET ORAL DAILY
Qty: 90 TABLET | Refills: 1 | Status: SHIPPED | OUTPATIENT
Start: 2024-10-16

## 2024-10-16 RX ORDER — METOPROLOL TARTRATE 50 MG
50 TABLET ORAL 2 TIMES DAILY
Qty: 180 TABLET | Refills: 1 | Status: SHIPPED | OUTPATIENT
Start: 2024-10-16

## 2024-10-16 RX ORDER — ROSUVASTATIN CALCIUM 20 MG/1
20 TABLET, COATED ORAL NIGHTLY
Qty: 90 TABLET | Refills: 1 | Status: SHIPPED | OUTPATIENT
Start: 2024-10-16

## 2024-10-16 RX ORDER — METOPROLOL TARTRATE 50 MG
50 TABLET ORAL 2 TIMES DAILY
Qty: 180 TABLET | Refills: 1 | Status: SHIPPED | OUTPATIENT
Start: 2024-10-16 | End: 2024-10-16 | Stop reason: SDUPTHER

## 2024-10-16 RX ORDER — LISINOPRIL 20 MG/1
20 TABLET ORAL DAILY
Qty: 90 TABLET | Refills: 1 | Status: SHIPPED | OUTPATIENT
Start: 2024-10-16 | End: 2024-10-16 | Stop reason: SDUPTHER

## 2024-10-16 NOTE — PROGRESS NOTES
Subjective   Follow up, syncope,, shortness of breath  Susan Page is a 79 y.o. female who presents to day for Follow-up (Cardiac management - 6 months //Labs-last set 10/2024//Medication- no refills needed //Patient did not bring med list or medicine bottles to appointment, med list has been reviewed and updated based on patient's knowledge of their meds.  ), Hypertension (Does not keep log at home but [facility] day care does monitor and she has had some low readings of 96/64 ), and Coronary Artery Disease (No complaint of chest pain / tightness/ palpitations- some SOA has increased  ).    CHIEF COMPLIANT  Chief Complaint   Patient presents with    Follow-up     Cardiac management - 6 months     Labs-last set 10/2024    Medication- no refills needed     Patient did not bring med list or medicine bottles to appointment, med list has been reviewed and updated based on patient's knowledge of their meds.      Hypertension     Does not keep log at home but [facility] day care does monitor and she has had some low readings of 96/64     Coronary Artery Disease     No complaint of chest pain / tightness/ palpitations- some SOA has increased         Active Problems:  Problem List Items Addressed This Visit          Cardiac and Vasculature    SSS (sick sinus syndrome)    Relevant Medications    metoprolol tartrate (LOPRESSOR) 50 MG tablet    Essential hypertension    Relevant Medications    furosemide (LASIX) 40 MG tablet    lisinopril (PRINIVIL,ZESTRIL) 20 MG tablet    metoprolol tartrate (LOPRESSOR) 50 MG tablet    Other Relevant Orders    Basic Metabolic Panel    Mixed hyperlipidemia    Relevant Medications    rosuvastatin (CRESTOR) 20 MG tablet       Endocrine and Metabolic    Diabetes mellitus       Pulmonary and Pneumonias    Shortness of breath    Relevant Orders    Adult Transthoracic Echo Complete w/ Color, Spectral and Contrast if necessary per protocol       Symptoms and Signs    Syncope and collapse - Primary     Relevant Orders    Cardiac Event Monitor    Adult Transthoracic Echo Complete w/ Color, Spectral and Contrast if necessary per protocol       HPI  HPI  Patient had a brief episode of syncope, about 3 weeks ago, when she got out of bed to bathroom, resulting in left humeral fracture, it was brief with no chest pain, no palpitations, she denies any palpitations, chest pain, but she has gradual worsening of shortness of breath, with intermittent edema, she uses the CPAP every night  PRIOR MEDS  Current Outpatient Medications on File Prior to Visit   Medication Sig Dispense Refill    acetaminophen (TYLENOL) 325 MG tablet Take 2 tablets by mouth Every 6 (Six) Hours As Needed for Mild Pain.      allopurinol (ZYLOPRIM) 300 MG tablet Take 1 tablet by mouth Daily.      aspirin (aspirin) 81 MG EC tablet Take 1 tablet by mouth Daily. 90 tablet 1    cetirizine (zyrTEC) 10 MG tablet Take 1 tablet by mouth Daily.      dicyclomine (BENTYL) 20 MG tablet Take 1 tablet by mouth Daily.      HYDROcodone-acetaminophen (NORCO) 5-325 MG per tablet Take 1 tablet by mouth Every 6 (Six) Hours As Needed for Moderate Pain.      Lantus SoloStar 100 UNIT/ML injection pen 32 Units 2 (Two) Times a Day.      meclizine (ANTIVERT) 12.5 MG tablet Every 6 (Six) Hours As Needed.      Multi Vitamin tablet tablet Daily.      nitroglycerin (NITROSTAT) 0.3 MG SL tablet 1 under the tongue as needed for angina, may repeat q5mins for up three doses 25 tablet 11    sertraline (ZOLOFT) 25 MG tablet Daily.      Ventolin  (90 Base) MCG/ACT inhaler prn      vitamin B-12 (CYANOCOBALAMIN) 1000 MCG tablet Take 1 tablet by mouth Daily.      [DISCONTINUED] furosemide (LASIX) 40 MG tablet Take 1 tablet by mouth Daily. 90 tablet 1    [DISCONTINUED] lisinopril (PRINIVIL,ZESTRIL) 40 MG tablet Take 1 tablet by mouth Daily. 90 tablet 1    [DISCONTINUED] metoprolol tartrate (LOPRESSOR) 100 MG tablet Take 1 tablet by mouth 2 (Two) Times a Day. 180 tablet 1     [DISCONTINUED] rosuvastatin (CRESTOR) 20 MG tablet TAKE 1 TABLET BY MOUTH EVERY NIGHT. 90 tablet 1    [DISCONTINUED] naproxen (NAPROSYN) 500 MG tablet Take 1 tablet by mouth 2 (Two) Times a Day As Needed. (Patient not taking: Reported on 10/16/2024)       No current facility-administered medications on file prior to visit.       ALLERGIES  Fish oil and Codeine    HISTORY  Past Medical History:   Diagnosis Date    Asthma     Broken shoulder     left 2024    CAD (coronary artery disease)     Diabetes mellitus     Edema     GERD (gastroesophageal reflux disease)     HTN (hypertension)     Hyperlipidemia     Old MI (myocardial infarction)     PILAR on CPAP     Shortness of breath     SSS (sick sinus syndrome)        Social History     Socioeconomic History    Marital status:    Tobacco Use    Smoking status: Never    Smokeless tobacco: Never   Vaping Use    Vaping status: Never Used   Substance and Sexual Activity    Alcohol use: Never    Drug use: Never    Sexual activity: Defer       Family History   Problem Relation Age of Onset    No Known Problems Mother     Asthma Father     No Known Problems Brother        Review of Systems   Constitutional:  Positive for fatigue. Negative for chills and fever.   HENT:  Positive for rhinorrhea. Negative for congestion and sinus pain.    Eyes:  Positive for visual disturbance.   Respiratory:  Positive for shortness of breath. Negative for apnea, cough, choking, chest tightness, wheezing and stridor.    Cardiovascular:  Negative for chest pain, palpitations and leg swelling.   Gastrointestinal: Negative.    Endocrine: Negative.    Genitourinary: Negative.    Musculoskeletal: Negative.  Negative for arthralgias, back pain and neck pain.   Skin: Negative.    Allergic/Immunologic: Negative for environmental allergies.   Neurological:  Positive for dizziness. Negative for syncope, weakness, numbness and headaches.   Hematological:  Bruises/bleeds easily.   Psychiatric/Behavioral:   "Positive for sleep disturbance (cpap).        Objective     VITALS: /61 (BP Location: Right arm, Patient Position: Sitting, Cuff Size: Adult)   Pulse 65   Ht 162.6 cm (64\")   Wt 83.9 kg (185 lb)   SpO2 94%   BMI 31.76 kg/m²     LABS:   Lab Results (most recent)       None            IMAGING:   No Images in the past 120 days found..    EXAM:  Physical Exam  Vitals reviewed.   Constitutional:       Appearance: She is well-developed.   HENT:      Head: Normocephalic and atraumatic.   Eyes:      Pupils: Pupils are equal, round, and reactive to light.   Neck:      Thyroid: No thyromegaly.      Vascular: No JVD.   Cardiovascular:      Rate and Rhythm: Normal rate and regular rhythm.      Heart sounds: Normal heart sounds. No murmur heard.     No friction rub. No gallop.   Pulmonary:      Effort: Pulmonary effort is normal. No respiratory distress.      Breath sounds: Normal breath sounds. No stridor. No wheezing or rales.   Chest:      Chest wall: No tenderness.   Musculoskeletal:         General: No tenderness or deformity.      Cervical back: Neck supple.      Comments: She is wearing left arm sling   Skin:     General: Skin is warm and dry.   Neurological:      Mental Status: She is alert and oriented to person, place, and time.      Cranial Nerves: No cranial nerve deficit.      Coordination: Coordination normal.         Procedure   Procedures        Assessment & Plan     Diagnoses and all orders for this visit:    1. Syncope and collapse (Primary)  -     Cardiac Event Monitor; Future  -     Adult Transthoracic Echo Complete w/ Color, Spectral and Contrast if necessary per protocol; Future    2. Shortness of breath  -     Adult Transthoracic Echo Complete w/ Color, Spectral and Contrast if necessary per protocol; Future    3. Essential hypertension  -     Discontinue: metoprolol tartrate (LOPRESSOR) 50 MG tablet; Take 1 tablet by mouth 2 (Two) Times a Day.  Dispense: 180 tablet; Refill: 1  -     " Discontinue: lisinopril (PRINIVIL,ZESTRIL) 20 MG tablet; Take 1 tablet by mouth Daily.  Dispense: 90 tablet; Refill: 1  -     furosemide (LASIX) 40 MG tablet; Take 1 tablet by mouth Daily.  Dispense: 90 tablet; Refill: 1  -     lisinopril (PRINIVIL,ZESTRIL) 20 MG tablet; Take 1 tablet by mouth Daily.  Dispense: 90 tablet; Refill: 1  -     metoprolol tartrate (LOPRESSOR) 50 MG tablet; Take 1 tablet by mouth 2 (Two) Times a Day.  Dispense: 180 tablet; Refill: 1  -     Basic Metabolic Panel; Future    4. Mixed hyperlipidemia  -     rosuvastatin (CRESTOR) 20 MG tablet; Take 1 tablet by mouth Every Night.  Dispense: 90 tablet; Refill: 1    5. SSS (sick sinus syndrome)    6. Type 2 diabetes mellitus without complication, without long-term current use of insulin    Blood pressure is low, possible vasovagal, will cut down dose of metoprolol to 50 mg BID, and will hold lisinopril, will get event monitor to rule out arrhythmia, and echocardiogram to assess cardiac function and valve morphology  No recent chest pain, continue current management  I reviewed labs, with HgA1c 6.4, but elevated creatinine to 1.82, better, but elevated triglycerides, and low HDL, good LDL, will hold lisinopril and repeat labs in 2 weeks  4. She uses CPAP every night    Return in about 4 weeks (around 11/13/2024).      Advance Care Planning   ACP discussion was held with the patient during this visit. Patient does not have an advance directive, declines further assistance.             MEDS ORDERED DURING VISIT:  New Medications Ordered This Visit   Medications    furosemide (LASIX) 40 MG tablet     Sig: Take 1 tablet by mouth Daily.     Dispense:  90 tablet     Refill:  1    lisinopril (PRINIVIL,ZESTRIL) 20 MG tablet     Sig: Take 1 tablet by mouth Daily.     Dispense:  90 tablet     Refill:  1    metoprolol tartrate (LOPRESSOR) 50 MG tablet     Sig: Take 1 tablet by mouth 2 (Two) Times a Day.     Dispense:  180 tablet     Refill:  1    rosuvastatin  (CRESTOR) 20 MG tablet     Sig: Take 1 tablet by mouth Every Night.     Dispense:  90 tablet     Refill:  1       As always, Yodit Ho MD  I appreciate very much the opportunity to participate in the cardiovascular care of your patients. Please do not hesitate to call me with any questions with regards to Susan Page evaluation and management.         This document has been electronically signed by Cassidy Villarreal MD  October 16, 2024 09:53 EDT    This note is dictated utilizing voice recognition software.

## 2024-11-05 ENCOUNTER — HOSPITAL ENCOUNTER (OUTPATIENT)
Dept: CARDIOLOGY | Facility: HOSPITAL | Age: 79
Discharge: HOME | End: 2024-11-05
Admitting: SPECIALIST
Payer: MEDICARE

## 2024-11-05 VITALS — HEIGHT: 64 IN | BODY MASS INDEX: 31.58 KG/M2 | WEIGHT: 184.97 LBS

## 2024-11-05 DIAGNOSIS — R55 SYNCOPE AND COLLAPSE: ICD-10-CM

## 2024-11-05 DIAGNOSIS — R06.02 SHORTNESS OF BREATH: ICD-10-CM

## 2024-11-05 LAB
AORTIC DIMENSIONLESS INDEX: 0.92 (DI)
BH CV ECHO MEAS - ACS: 1.69 CM
BH CV ECHO MEAS - AO MAX PG: 5 MMHG
BH CV ECHO MEAS - AO MEAN PG: 2.43 MMHG
BH CV ECHO MEAS - AO ROOT DIAM: 3 CM
BH CV ECHO MEAS - AO V2 MAX: 112.1 CM/SEC
BH CV ECHO MEAS - AO V2 VTI: 24.5 CM
BH CV ECHO MEAS - EDV(CUBED): 130.8 ML
BH CV ECHO MEAS - EF(MOD-BP): 59 %
BH CV ECHO MEAS - ESV(CUBED): 42.6 ML
BH CV ECHO MEAS - FS: 31.2 %
BH CV ECHO MEAS - IVS/LVPW: 0.99 CM
BH CV ECHO MEAS - IVSD: 1.06 CM
BH CV ECHO MEAS - LA DIMENSION: 4 CM
BH CV ECHO MEAS - LAT PEAK E' VEL: 5.6 CM/SEC
BH CV ECHO MEAS - LV MASS(C)D: 202.3 GRAMS
BH CV ECHO MEAS - LV MAX PG: 3.9 MMHG
BH CV ECHO MEAS - LV MEAN PG: 1.82 MMHG
BH CV ECHO MEAS - LV V1 MAX: 98.2 CM/SEC
BH CV ECHO MEAS - LV V1 VTI: 22.7 CM
BH CV ECHO MEAS - LVIDD: 5.1 CM
BH CV ECHO MEAS - LVIDS: 3.5 CM
BH CV ECHO MEAS - LVPWD: 1.07 CM
BH CV ECHO MEAS - MED PEAK E' VEL: 4.7 CM/SEC
BH CV ECHO MEAS - MV A MAX VEL: 86.6 CM/SEC
BH CV ECHO MEAS - MV DEC SLOPE: 215.1 CM/SEC2
BH CV ECHO MEAS - MV DEC TIME: 0.3 SEC
BH CV ECHO MEAS - MV E MAX VEL: 59.5 CM/SEC
BH CV ECHO MEAS - MV E/A: 0.69
BH CV ECHO MEAS - MV MAX PG: 3.4 MMHG
BH CV ECHO MEAS - MV MEAN PG: 1.55 MMHG
BH CV ECHO MEAS - MV P1/2T: 97.4 MSEC
BH CV ECHO MEAS - MV V2 VTI: 36.7 CM
BH CV ECHO MEAS - MVA(P1/2T): 2.26 CM2
BH CV ECHO MEAS - PA V2 MAX: 68.6 CM/SEC
BH CV ECHO MEAS - RAP SYSTOLE: 8 MMHG
BH CV ECHO MEAS - RV MAX PG: 0.74 MMHG
BH CV ECHO MEAS - RV V1 MAX: 43 CM/SEC
BH CV ECHO MEAS - RV V1 VTI: 11.7 CM
BH CV ECHO MEAS - RVSP: 29.9 MMHG
BH CV ECHO MEAS - TAPSE (>1.6): 2.09 CM
BH CV ECHO MEAS - TR MAX PG: 21.9 MMHG
BH CV ECHO MEAS - TR MAX VEL: 233.8 CM/SEC
BH CV ECHO MEASUREMENTS AVERAGE E/E' RATIO: 11.55
BH CV XLRA - TDI S': 10.1 CM/SEC
SINUS: 2.7 CM

## 2024-11-05 PROCEDURE — 93306 TTE W/DOPPLER COMPLETE: CPT | Performed by: SPECIALIST

## 2024-11-05 PROCEDURE — 93306 TTE W/DOPPLER COMPLETE: CPT

## 2024-11-20 ENCOUNTER — OFFICE VISIT (OUTPATIENT)
Dept: CARDIOLOGY | Facility: CLINIC | Age: 79
End: 2024-11-20
Payer: MEDICARE

## 2024-11-20 VITALS
HEART RATE: 68 BPM | BODY MASS INDEX: 30.46 KG/M2 | OXYGEN SATURATION: 96 % | HEIGHT: 64 IN | SYSTOLIC BLOOD PRESSURE: 120 MMHG | WEIGHT: 178.4 LBS | DIASTOLIC BLOOD PRESSURE: 70 MMHG

## 2024-11-20 DIAGNOSIS — I10 ESSENTIAL HYPERTENSION: Primary | ICD-10-CM

## 2024-11-20 DIAGNOSIS — G47.33 OSA ON CPAP: ICD-10-CM

## 2024-11-20 DIAGNOSIS — E11.9 TYPE 2 DIABETES MELLITUS WITHOUT COMPLICATION, WITHOUT LONG-TERM CURRENT USE OF INSULIN: ICD-10-CM

## 2024-11-20 DIAGNOSIS — E78.2 MIXED HYPERLIPIDEMIA: ICD-10-CM

## 2024-11-20 DIAGNOSIS — I25.84 CORONARY ARTERY DISEASE DUE TO CALCIFIED CORONARY LESION: ICD-10-CM

## 2024-11-20 DIAGNOSIS — I25.10 CORONARY ARTERY DISEASE DUE TO CALCIFIED CORONARY LESION: ICD-10-CM

## 2024-11-20 DIAGNOSIS — I47.10 PAROXYSMAL SVT (SUPRAVENTRICULAR TACHYCARDIA): ICD-10-CM

## 2024-11-20 DIAGNOSIS — I49.5 SSS (SICK SINUS SYNDROME): ICD-10-CM

## 2024-11-20 DIAGNOSIS — R06.02 SHORTNESS OF BREATH: ICD-10-CM

## 2024-11-20 PROCEDURE — 99214 OFFICE O/P EST MOD 30 MIN: CPT | Performed by: SPECIALIST

## 2024-11-20 PROCEDURE — 3078F DIAST BP <80 MM HG: CPT | Performed by: SPECIALIST

## 2024-11-20 PROCEDURE — 3074F SYST BP LT 130 MM HG: CPT | Performed by: SPECIALIST

## 2024-11-20 RX ORDER — ROSUVASTATIN CALCIUM 20 MG/1
20 TABLET, COATED ORAL NIGHTLY
Qty: 90 TABLET | Refills: 1 | Status: SHIPPED | OUTPATIENT
Start: 2024-11-20

## 2024-11-20 RX ORDER — METOPROLOL TARTRATE 50 MG
50 TABLET ORAL 2 TIMES DAILY
Qty: 180 TABLET | Refills: 1 | Status: SHIPPED | OUTPATIENT
Start: 2024-11-20

## 2024-11-20 RX ORDER — FUROSEMIDE 40 MG/1
40 TABLET ORAL DAILY
Qty: 90 TABLET | Refills: 1 | Status: SHIPPED | OUTPATIENT
Start: 2024-11-20

## 2024-11-20 RX ORDER — LISINOPRIL 20 MG/1
20 TABLET ORAL DAILY
Qty: 90 TABLET | Refills: 1 | Status: SHIPPED | OUTPATIENT
Start: 2024-11-20

## 2024-11-20 RX ORDER — NITROGLYCERIN 0.3 MG/1
TABLET SUBLINGUAL
Qty: 25 TABLET | Refills: 11 | Status: SHIPPED | OUTPATIENT
Start: 2024-11-20

## 2024-11-20 NOTE — PROGRESS NOTES
Subjective   Follow up, HTN, CAD  Susan Page is a 79 y.o. female who presents to day for Follow-up (Cardiac management - 1 month visit //Labs-10/7/2024//Medication - no refills needed ), syncope and collapse  (Review heart monitor results /Review Echo results //Still having dizzy spells but no passing out ), and Shortness of Breath (Review heart monitor results /Review Echo results //No change with SOA ).    CHIEF COMPLIANT  Chief Complaint   Patient presents with    Follow-up     Cardiac management - 1 month visit     Labs-10/7/2024    Medication - no refills needed     syncope and collapse      Review heart monitor results   Review Echo results     Still having dizzy spells but no passing out     Shortness of Breath     Review heart monitor results   Review Echo results     No change with SOA        Active Problems:  1. CAD  1.1 [facility], 9-, EF 50-55%, LAD LIs, nl. RCA, NL. LCx  2. SSS  2. HX MI in past  4. HTN  5. Hyperlipidemia  6. PILAR, uses C-PAP, followed by Dr. Collins.  7. Asthma  8. GERD  9. NIDDM    HPI  HPI    History of Present Illness  The patient presents for evaluation of multiple medical concerns. She is accompanied by an adult female.    She has been monitoring her blood pressure at home, with readings fluctuating between 92 and 182. Her heart rate has also been recorded as high as 160. She reports no chest pain or pressure but does experience shortness of breath, which has not worsened. She is able to walk short distances before becoming breathless. She also experiences swelling and occasional heart palpitations. She uses a CPAP machine nightly and is under the care of Dr. Aguirre for this. She is currently on cholesterol medication and maintains a low-salt diet. She takes aspirin daily and attends therapy sessions twice a week for mobility.    She has not consulted a nephrologist. Her  reports that Dr. Ho expressed concern about her kidney function, which was at 48 percent last  month. He conducted further blood work yesterday to reassess her kidney function. Approximately six years ago, her kidney function significantly declined, leading to changes in her diabetes medication regimen.       PRIOR MEDS  Current Outpatient Medications on File Prior to Visit   Medication Sig Dispense Refill    acetaminophen (TYLENOL) 325 MG tablet Take 2 tablets by mouth Every 6 (Six) Hours As Needed for Mild Pain.      allopurinol (ZYLOPRIM) 300 MG tablet Take 1 tablet by mouth Daily.      aspirin (aspirin) 81 MG EC tablet Take 1 tablet by mouth Daily. 90 tablet 1    cetirizine (zyrTEC) 10 MG tablet Take 1 tablet by mouth Daily.      dicyclomine (BENTYL) 20 MG tablet Take 1 tablet by mouth Daily.      HYDROcodone-acetaminophen (NORCO) 5-325 MG per tablet Take 1 tablet by mouth Every 6 (Six) Hours As Needed for Moderate Pain.      Lantus SoloStar 100 UNIT/ML injection pen 32 Units 2 (Two) Times a Day.      meclizine (ANTIVERT) 12.5 MG tablet Every 6 (Six) Hours As Needed.      Multi Vitamin tablet tablet Daily.      sertraline (ZOLOFT) 25 MG tablet Daily.      Ventolin  (90 Base) MCG/ACT inhaler prn      vitamin B-12 (CYANOCOBALAMIN) 1000 MCG tablet Take 1 tablet by mouth Daily.      [DISCONTINUED] furosemide (LASIX) 40 MG tablet Take 1 tablet by mouth Daily. 90 tablet 1    [DISCONTINUED] lisinopril (PRINIVIL,ZESTRIL) 20 MG tablet Take 1 tablet by mouth Daily. 90 tablet 1    [DISCONTINUED] metoprolol tartrate (LOPRESSOR) 50 MG tablet Take 1 tablet by mouth 2 (Two) Times a Day. 180 tablet 1    [DISCONTINUED] nitroglycerin (NITROSTAT) 0.3 MG SL tablet 1 under the tongue as needed for angina, may repeat q5mins for up three doses 25 tablet 11    [DISCONTINUED] rosuvastatin (CRESTOR) 20 MG tablet Take 1 tablet by mouth Every Night. 90 tablet 1     No current facility-administered medications on file prior to visit.       ALLERGIES  Fish oil and Codeine    HISTORY  Past Medical History:   Diagnosis Date     "Asthma     Broken shoulder     left 2024    CAD (coronary artery disease)     Diabetes mellitus     Edema     GERD (gastroesophageal reflux disease)     HTN (hypertension)     Hyperlipidemia     Old MI (myocardial infarction)     PILAR on CPAP     Shortness of breath     SSS (sick sinus syndrome)        Social History     Socioeconomic History    Marital status:    Tobacco Use    Smoking status: Never    Smokeless tobacco: Never   Vaping Use    Vaping status: Never Used   Substance and Sexual Activity    Alcohol use: Never    Drug use: Never    Sexual activity: Defer       Family History   Problem Relation Age of Onset    No Known Problems Mother     Asthma Father     No Known Problems Brother        Review of Systems   Constitutional:  Positive for fatigue. Negative for chills and fever.   HENT:  Negative for congestion, rhinorrhea and sore throat.    Eyes:  Positive for visual disturbance.   Respiratory:  Positive for shortness of breath. Negative for chest tightness and wheezing.    Cardiovascular: Negative.  Negative for chest pain, palpitations and leg swelling.   Gastrointestinal: Negative.    Endocrine: Negative.    Genitourinary: Negative.    Musculoskeletal:  Negative for arthralgias, back pain and neck pain.   Skin: Negative.    Allergic/Immunologic: Positive for environmental allergies.   Neurological:  Positive for dizziness. Negative for syncope, weakness, numbness and headaches.   Hematological:  Bruises/bleeds easily.   Psychiatric/Behavioral:  Positive for sleep disturbance (cpap).        Objective     VITALS: /70 (BP Location: Right arm, Patient Position: Sitting, Cuff Size: Adult)   Pulse 68   Ht 162 cm (63.78\")   Wt 80.9 kg (178 lb 6.4 oz)   SpO2 96%   BMI 30.83 kg/m²     LABS:   Lab Results (most recent)       None            IMAGING:   No Images in the past 120 days found..    EXAM:  Physical Exam  Vitals reviewed.   Constitutional:       Appearance: She is well-developed. "   HENT:      Head: Normocephalic and atraumatic.   Eyes:      Pupils: Pupils are equal, round, and reactive to light.   Neck:      Thyroid: No thyromegaly.      Vascular: No JVD.   Cardiovascular:      Rate and Rhythm: Normal rate and regular rhythm.      Heart sounds: Normal heart sounds. No murmur heard.     No friction rub. No gallop.      Comments: Trace edema  Pulmonary:      Effort: Pulmonary effort is normal. No respiratory distress.      Breath sounds: Normal breath sounds. No stridor. No wheezing or rales.   Chest:      Chest wall: No tenderness.   Musculoskeletal:         General: No tenderness or deformity.      Cervical back: Neck supple.   Skin:     General: Skin is warm and dry.   Neurological:      Mental Status: She is alert and oriented to person, place, and time.      Cranial Nerves: No cranial nerve deficit.      Coordination: Coordination normal.       Physical Exam  Clear lungs.    Vital Signs  Heart rate is 68. Blood pressure is 120/70.       Procedure   Procedures      Results  Laboratory Studies  Hemoglobin A1c is 6.4. Kidney function is 1.85. Triglycerides are 185. Good cholesterol (HDL) is 30. Bad cholesterol (LDL) is 70.    Imaging  Echocardiogram shows heart strength in the lower range of normal, heart is a bit stiff, top chamber is a little bit enlarged, and mild leakage in the tricuspid valve.    Testing  Heart monitor shows average heart rate is 69, with a drop to 46 at night and a peak of 167 beats.       Assessment & Plan     Diagnoses and all orders for this visit:    1. Essential hypertension (Primary)  -     furosemide (LASIX) 40 MG tablet; Take 1 tablet by mouth Daily.  Dispense: 90 tablet; Refill: 1  -     lisinopril (PRINIVIL,ZESTRIL) 20 MG tablet; Take 1 tablet by mouth Daily.  Dispense: 90 tablet; Refill: 1  -     metoprolol tartrate (LOPRESSOR) 50 MG tablet; Take 1 tablet by mouth 2 (Two) Times a Day.  Dispense: 180 tablet; Refill: 1  -     Duplex Renal Artery - Bilateral  Complete CAR; Future    2. Mixed hyperlipidemia  -     rosuvastatin (CRESTOR) 20 MG tablet; Take 1 tablet by mouth Every Night.  Dispense: 90 tablet; Refill: 1  -     Lipid Panel; Future  -     Comprehensive Metabolic Panel; Future    3. Coronary artery disease due to calcified coronary lesion  -     nitroglycerin (NITROSTAT) 0.3 MG SL tablet; 1 under the tongue as needed for angina, may repeat q5mins for up three doses  Dispense: 25 tablet; Refill: 11    4. SSS (sick sinus syndrome)    5. Shortness of breath    6. PILAR on CPAP    7. Type 2 diabetes mellitus without complication, without long-term current use of insulin  -     Hemoglobin A1c; Future    8. Paroxysmal SVT (supraventricular tachycardia)      Assessment & Plan  1. Hypertension.  Her blood pressure readings fluctuate, often higher in the morning and better by evening. This could be due to deconditioning. She was advised to maintain physical activity to prevent muscle weakness. She was also advised to limit salt intake and caffeinated products, ensure adequate sleep and rest, and continue using her CPAP machine. No changes were made to her current medication regimen. Refills for all her medications were provided.    2. Prediabetes.  Her hemoglobin A1c is slightly elevated at 6.4, indicating prediabetes. She was advised to reduce carbohydrate intake and increase vegetable consumption.    3. Hypertriglyceridemia.  Her triglycerides are high at 185, and her HDL is low at 30, increasing her risk for heart attack and stroke. Her LDL is within normal range at 70. She was advised to reduce carbohydrate intake and increase vegetable consumption.    4. Heart disease.  Her echocardiogram shows normal heart strain, but the heart is slightly stiff and the top chamber is mildly enlarged. There is also mild tricuspid regurgitation. Her heart rate monitor shows an average heart rate of 69, with occasional episodes of supraventricular tachycardia. She was advised to  continue taking aspirin daily.    5. Kidney disease.  Her kidney function is at 28 percent. An ultrasound of her kidney arteries was ordered to assess for potential narrowing, which could contribute to her fluctuating blood pressure and declining kidney function.    Follow-up  Return in 6 months for follow up.       Return in about 6 months (around 2025).      Advance Care Planning   ACP discussion was held with the patient during this visit. Patient does not have an advance directive, information provided.             MEDS ORDERED DURING VISIT:  New Medications Ordered This Visit   Medications    furosemide (LASIX) 40 MG tablet     Sig: Take 1 tablet by mouth Daily.     Dispense:  90 tablet     Refill:  1    lisinopril (PRINIVIL,ZESTRIL) 20 MG tablet     Sig: Take 1 tablet by mouth Daily.     Dispense:  90 tablet     Refill:  1    metoprolol tartrate (LOPRESSOR) 50 MG tablet     Sig: Take 1 tablet by mouth 2 (Two) Times a Day.     Dispense:  180 tablet     Refill:  1    nitroglycerin (NITROSTAT) 0.3 MG SL tablet     Si under the tongue as needed for angina, may repeat q5mins for up three doses     Dispense:  25 tablet     Refill:  11    rosuvastatin (CRESTOR) 20 MG tablet     Sig: Take 1 tablet by mouth Every Night.     Dispense:  90 tablet     Refill:  1         As always, Yodit Ho MD  I appreciate very much the opportunity to participate in the cardiovascular care of your patients. Please do not hesitate to call me with any questions with regards to Susan Page evaluation and management.     Patient or patient representative verbalized consent for the use of Ambient Listening during the visit with  Cassidy Villarreal MD for chart documentation. 2024  10:07 EST       This document has been electronically signed by Cassidy Villarreal MD  2024 10:51 EST    This note is dictated utilizing voice recognition software.

## 2024-12-11 ENCOUNTER — HOSPITAL ENCOUNTER (OUTPATIENT)
Dept: CARDIOLOGY | Facility: HOSPITAL | Age: 79
Discharge: HOME | End: 2024-12-11
Admitting: SPECIALIST
Payer: MEDICARE

## 2024-12-11 DIAGNOSIS — I10 ESSENTIAL HYPERTENSION: ICD-10-CM

## 2024-12-11 PROCEDURE — 93975 VASCULAR STUDY: CPT

## 2025-05-12 ENCOUNTER — LAB (OUTPATIENT)
Dept: LAB | Facility: HOSPITAL | Age: 80
End: 2025-05-12
Payer: MEDICARE

## 2025-05-12 DIAGNOSIS — E11.9 TYPE 2 DIABETES MELLITUS WITHOUT COMPLICATION, WITHOUT LONG-TERM CURRENT USE OF INSULIN: ICD-10-CM

## 2025-05-12 DIAGNOSIS — I10 ESSENTIAL HYPERTENSION: ICD-10-CM

## 2025-05-12 DIAGNOSIS — E78.2 MIXED HYPERLIPIDEMIA: ICD-10-CM

## 2025-05-12 LAB
ALBUMIN SERPL-MCNC: 3.9 G/DL (ref 3.5–5.2)
ALBUMIN/GLOB SERPL: 1.2 G/DL
ALP SERPL-CCNC: 200 U/L (ref 39–117)
ALT SERPL W P-5'-P-CCNC: 13 U/L (ref 1–33)
ANION GAP SERPL CALCULATED.3IONS-SCNC: 9.7 MMOL/L (ref 5–15)
AST SERPL-CCNC: 26 U/L (ref 1–32)
BILIRUB SERPL-MCNC: 0.3 MG/DL (ref 0–1.2)
BUN SERPL-MCNC: 30 MG/DL (ref 8–23)
BUN/CREAT SERPL: 18.1 (ref 7–25)
CALCIUM SPEC-SCNC: 9.8 MG/DL (ref 8.6–10.5)
CHLORIDE SERPL-SCNC: 99 MMOL/L (ref 98–107)
CHOLEST SERPL-MCNC: 123 MG/DL (ref 0–200)
CO2 SERPL-SCNC: 26.3 MMOL/L (ref 22–29)
CREAT SERPL-MCNC: 1.66 MG/DL (ref 0.57–1)
EGFRCR SERPLBLD CKD-EPI 2021: 31.5 ML/MIN/1.73
GLOBULIN UR ELPH-MCNC: 3.3 GM/DL
GLUCOSE SERPL-MCNC: 119 MG/DL (ref 65–99)
HBA1C MFR BLD: 6.2 % (ref 4.8–5.6)
HDLC SERPL-MCNC: 30 MG/DL (ref 40–60)
LDLC SERPL CALC-MCNC: 65 MG/DL (ref 0–100)
LDLC/HDLC SERPL: 2 {RATIO}
POTASSIUM SERPL-SCNC: 5.3 MMOL/L (ref 3.5–5.2)
PROT SERPL-MCNC: 7.2 G/DL (ref 6–8.5)
SODIUM SERPL-SCNC: 135 MMOL/L (ref 136–145)
TRIGL SERPL-MCNC: 165 MG/DL (ref 0–150)
VLDLC SERPL-MCNC: 28 MG/DL (ref 5–40)

## 2025-05-12 PROCEDURE — 36415 COLL VENOUS BLD VENIPUNCTURE: CPT

## 2025-05-12 PROCEDURE — 80053 COMPREHEN METABOLIC PANEL: CPT

## 2025-05-12 PROCEDURE — 83036 HEMOGLOBIN GLYCOSYLATED A1C: CPT

## 2025-05-12 PROCEDURE — 80061 LIPID PANEL: CPT
